# Patient Record
Sex: MALE | Race: WHITE | Employment: FULL TIME
[De-identification: names, ages, dates, MRNs, and addresses within clinical notes are randomized per-mention and may not be internally consistent; named-entity substitution may affect disease eponyms.]

---

## 2023-11-13 ENCOUNTER — APPOINTMENT (OUTPATIENT)
Facility: HOSPITAL | Age: 53
End: 2023-11-13
Payer: COMMERCIAL

## 2023-11-13 ENCOUNTER — HOSPITAL ENCOUNTER (EMERGENCY)
Facility: HOSPITAL | Age: 53
Discharge: ANOTHER HOSPITAL | End: 2023-11-14
Attending: EMERGENCY MEDICINE
Payer: COMMERCIAL

## 2023-11-13 DIAGNOSIS — R73.9 HYPERGLYCEMIA: ICD-10-CM

## 2023-11-13 DIAGNOSIS — E86.0 DEHYDRATION: ICD-10-CM

## 2023-11-13 DIAGNOSIS — K62.89 MASS OF ANUS: Primary | ICD-10-CM

## 2023-11-13 LAB
ALBUMIN SERPL-MCNC: 2.9 G/DL (ref 3.5–5)
ALBUMIN/GLOB SERPL: 0.7 (ref 1.1–2.2)
ALP SERPL-CCNC: 103 U/L (ref 45–117)
ALT SERPL-CCNC: 17 U/L (ref 12–78)
ANION GAP BLD CALC-SCNC: 8 (ref 10–20)
ANION GAP SERPL CALC-SCNC: 12 MMOL/L (ref 5–15)
APPEARANCE UR: CLEAR
AST SERPL-CCNC: 19 U/L (ref 15–37)
BACTERIA URNS QL MICRO: NEGATIVE /HPF
BASOPHILS # BLD: 0 K/UL (ref 0–0.1)
BASOPHILS NFR BLD: 0 % (ref 0–1)
BILIRUB SERPL-MCNC: 0.5 MG/DL (ref 0.2–1)
BILIRUB UR QL: NEGATIVE
BUN SERPL-MCNC: 8 MG/DL (ref 6–20)
BUN/CREAT SERPL: 8 (ref 12–20)
CA-I BLD-MCNC: 1.18 MMOL/L (ref 1.12–1.32)
CALCIUM SERPL-MCNC: 9.2 MG/DL (ref 8.5–10.1)
CHLORIDE BLD-SCNC: 99 MMOL/L (ref 100–108)
CHLORIDE SERPL-SCNC: 95 MMOL/L (ref 97–108)
CO2 BLD-SCNC: 26 MMOL/L (ref 19–24)
CO2 SERPL-SCNC: 26 MMOL/L (ref 21–32)
COLOR UR: ABNORMAL
CREAT SERPL-MCNC: 1.02 MG/DL (ref 0.7–1.3)
CREAT UR-MCNC: 0.7 MG/DL (ref 0.6–1.3)
DIFFERENTIAL METHOD BLD: ABNORMAL
EOSINOPHIL # BLD: 0 K/UL (ref 0–0.4)
EOSINOPHIL NFR BLD: 0 % (ref 0–7)
EPITH CASTS URNS QL MICRO: ABNORMAL /LPF
ERYTHROCYTE [DISTWIDTH] IN BLOOD BY AUTOMATED COUNT: 11 % (ref 11.5–14.5)
GLOBULIN SER CALC-MCNC: 4.3 G/DL (ref 2–4)
GLUCOSE BLD STRIP.AUTO-MCNC: 308 MG/DL (ref 65–117)
GLUCOSE BLD STRIP.AUTO-MCNC: 319 MG/DL (ref 65–117)
GLUCOSE BLD STRIP.AUTO-MCNC: 323 MG/DL (ref 65–117)
GLUCOSE BLD STRIP.AUTO-MCNC: 393 MG/DL (ref 65–117)
GLUCOSE BLD STRIP.AUTO-MCNC: 402 MG/DL (ref 74–106)
GLUCOSE SERPL-MCNC: 385 MG/DL (ref 65–100)
GLUCOSE UR STRIP.AUTO-MCNC: >1000 MG/DL
HCT VFR BLD AUTO: 43.2 % (ref 36.6–50.3)
HGB BLD-MCNC: 15 G/DL (ref 12.1–17)
HGB UR QL STRIP: NEGATIVE
IMM GRANULOCYTES # BLD AUTO: 0.1 K/UL (ref 0–0.04)
IMM GRANULOCYTES NFR BLD AUTO: 1 % (ref 0–0.5)
KETONES UR QL STRIP.AUTO: 80 MG/DL
LACTATE BLD-SCNC: 1.39 MMOL/L (ref 0.4–2)
LEUKOCYTE ESTERASE UR QL STRIP.AUTO: NEGATIVE
LIPASE SERPL-CCNC: 20 U/L (ref 13–75)
LYMPHOCYTES # BLD: 1.3 K/UL (ref 0.8–3.5)
LYMPHOCYTES NFR BLD: 10 % (ref 12–49)
MCH RBC QN AUTO: 31.3 PG (ref 26–34)
MCHC RBC AUTO-ENTMCNC: 34.7 G/DL (ref 30–36.5)
MCV RBC AUTO: 90.2 FL (ref 80–99)
MONOCYTES # BLD: 1.3 K/UL (ref 0–1)
MONOCYTES NFR BLD: 10 % (ref 5–13)
NEUTS SEG # BLD: 11 K/UL (ref 1.8–8)
NEUTS SEG NFR BLD: 79 % (ref 32–75)
NITRITE UR QL STRIP.AUTO: NEGATIVE
NRBC # BLD: 0 K/UL (ref 0–0.01)
NRBC BLD-RTO: 0 PER 100 WBC
PH UR STRIP: 5.5 (ref 5–8)
PLATELET # BLD AUTO: 327 K/UL (ref 150–400)
PMV BLD AUTO: 9.8 FL (ref 8.9–12.9)
POTASSIUM BLD-SCNC: 4.2 MMOL/L (ref 3.5–5.5)
POTASSIUM SERPL-SCNC: 4.4 MMOL/L (ref 3.5–5.1)
PROT SERPL-MCNC: 7.2 G/DL (ref 6.4–8.2)
PROT UR STRIP-MCNC: NEGATIVE MG/DL
RBC # BLD AUTO: 4.79 M/UL (ref 4.1–5.7)
RBC #/AREA URNS HPF: ABNORMAL /HPF (ref 0–5)
SERVICE CMNT-IMP: ABNORMAL
SODIUM BLD-SCNC: 133 MMOL/L (ref 136–145)
SODIUM SERPL-SCNC: 133 MMOL/L (ref 136–145)
SP GR UR REFRACTOMETRY: 1.01 (ref 1–1.03)
SPECIMEN SITE: ABNORMAL
TROPONIN I SERPL HS-MCNC: 32 NG/L (ref 0–76)
URINE CULTURE IF INDICATED: ABNORMAL
UROBILINOGEN UR QL STRIP.AUTO: 0.2 EU/DL (ref 0.2–1)
WBC # BLD AUTO: 13.9 K/UL (ref 4.1–11.1)
WBC URNS QL MICRO: ABNORMAL /HPF (ref 0–4)

## 2023-11-13 PROCEDURE — 74177 CT ABD & PELVIS W/CONTRAST: CPT

## 2023-11-13 PROCEDURE — 80053 COMPREHEN METABOLIC PANEL: CPT

## 2023-11-13 PROCEDURE — 83605 ASSAY OF LACTIC ACID: CPT

## 2023-11-13 PROCEDURE — 80047 BASIC METABLC PNL IONIZED CA: CPT

## 2023-11-13 PROCEDURE — 6360000002 HC RX W HCPCS: Performed by: EMERGENCY MEDICINE

## 2023-11-13 PROCEDURE — 36415 COLL VENOUS BLD VENIPUNCTURE: CPT

## 2023-11-13 PROCEDURE — 87040 BLOOD CULTURE FOR BACTERIA: CPT

## 2023-11-13 PROCEDURE — 96375 TX/PRO/DX INJ NEW DRUG ADDON: CPT

## 2023-11-13 PROCEDURE — 99285 EMERGENCY DEPT VISIT HI MDM: CPT

## 2023-11-13 PROCEDURE — 85025 COMPLETE CBC W/AUTO DIFF WBC: CPT

## 2023-11-13 PROCEDURE — 6370000000 HC RX 637 (ALT 250 FOR IP): Performed by: EMERGENCY MEDICINE

## 2023-11-13 PROCEDURE — 93005 ELECTROCARDIOGRAM TRACING: CPT | Performed by: EMERGENCY MEDICINE

## 2023-11-13 PROCEDURE — 82962 GLUCOSE BLOOD TEST: CPT

## 2023-11-13 PROCEDURE — 96376 TX/PRO/DX INJ SAME DRUG ADON: CPT

## 2023-11-13 PROCEDURE — 96366 THER/PROPH/DIAG IV INF ADDON: CPT

## 2023-11-13 PROCEDURE — 84484 ASSAY OF TROPONIN QUANT: CPT

## 2023-11-13 PROCEDURE — 87154 CUL TYP ID BLD PTHGN 6+ TRGT: CPT

## 2023-11-13 PROCEDURE — 96365 THER/PROPH/DIAG IV INF INIT: CPT

## 2023-11-13 PROCEDURE — 2580000003 HC RX 258: Performed by: EMERGENCY MEDICINE

## 2023-11-13 PROCEDURE — 81001 URINALYSIS AUTO W/SCOPE: CPT

## 2023-11-13 PROCEDURE — 96367 TX/PROPH/DG ADDL SEQ IV INF: CPT

## 2023-11-13 PROCEDURE — 96361 HYDRATE IV INFUSION ADD-ON: CPT

## 2023-11-13 PROCEDURE — 87077 CULTURE AEROBIC IDENTIFY: CPT

## 2023-11-13 PROCEDURE — 83690 ASSAY OF LIPASE: CPT

## 2023-11-13 PROCEDURE — 6360000004 HC RX CONTRAST MEDICATION: Performed by: EMERGENCY MEDICINE

## 2023-11-13 PROCEDURE — 87186 SC STD MICRODIL/AGAR DIL: CPT

## 2023-11-13 RX ORDER — MORPHINE SULFATE 2 MG/ML
2 INJECTION, SOLUTION INTRAMUSCULAR; INTRAVENOUS
Status: DISCONTINUED | OUTPATIENT
Start: 2023-11-13 | End: 2023-11-14 | Stop reason: HOSPADM

## 2023-11-13 RX ORDER — 0.9 % SODIUM CHLORIDE 0.9 %
1000 INTRAVENOUS SOLUTION INTRAVENOUS ONCE
Status: COMPLETED | OUTPATIENT
Start: 2023-11-13 | End: 2023-11-13

## 2023-11-13 RX ORDER — MORPHINE SULFATE 2 MG/ML
2 INJECTION, SOLUTION INTRAMUSCULAR; INTRAVENOUS
Status: COMPLETED | OUTPATIENT
Start: 2023-11-13 | End: 2023-11-13

## 2023-11-13 RX ORDER — SODIUM CHLORIDE 9 MG/ML
INJECTION, SOLUTION INTRAVENOUS CONTINUOUS
Status: DISCONTINUED | OUTPATIENT
Start: 2023-11-13 | End: 2023-11-14 | Stop reason: HOSPADM

## 2023-11-13 RX ORDER — MORPHINE SULFATE 2 MG/ML
2 INJECTION, SOLUTION INTRAMUSCULAR; INTRAVENOUS EVERY 4 HOURS PRN
Status: DISCONTINUED | OUTPATIENT
Start: 2023-11-13 | End: 2023-11-13

## 2023-11-13 RX ADMIN — MORPHINE SULFATE 2 MG: 2 INJECTION, SOLUTION INTRAMUSCULAR; INTRAVENOUS at 21:23

## 2023-11-13 RX ADMIN — VANCOMYCIN HYDROCHLORIDE 2250 MG: 1 INJECTION, POWDER, LYOPHILIZED, FOR SOLUTION INTRAVENOUS at 21:30

## 2023-11-13 RX ADMIN — SODIUM CHLORIDE 1000 ML: 9 INJECTION, SOLUTION INTRAVENOUS at 16:54

## 2023-11-13 RX ADMIN — PIPERACILLIN AND TAZOBACTAM 4500 MG: 4; .5 INJECTION, POWDER, LYOPHILIZED, FOR SOLUTION INTRAVENOUS at 20:20

## 2023-11-13 RX ADMIN — SODIUM CHLORIDE 1000 ML: 9 INJECTION, SOLUTION INTRAVENOUS at 18:17

## 2023-11-13 RX ADMIN — SODIUM CHLORIDE: 9 INJECTION, SOLUTION INTRAVENOUS at 23:20

## 2023-11-13 RX ADMIN — INSULIN HUMAN 10 UNITS: 100 INJECTION, SOLUTION PARENTERAL at 19:42

## 2023-11-13 RX ADMIN — MORPHINE SULFATE 2 MG: 2 INJECTION, SOLUTION INTRAMUSCULAR; INTRAVENOUS at 18:18

## 2023-11-13 RX ADMIN — IOPAMIDOL 100 ML: 755 INJECTION, SOLUTION INTRAVENOUS at 17:47

## 2023-11-13 ASSESSMENT — LIFESTYLE VARIABLES
HOW MANY STANDARD DRINKS CONTAINING ALCOHOL DO YOU HAVE ON A TYPICAL DAY: 1 OR 2
HOW OFTEN DO YOU HAVE A DRINK CONTAINING ALCOHOL: 4 OR MORE TIMES A WEEK

## 2023-11-13 ASSESSMENT — PAIN SCALES - GENERAL
PAINLEVEL_OUTOF10: 7
PAINLEVEL_OUTOF10: 10
PAINLEVEL_OUTOF10: 10
PAINLEVEL_OUTOF10: 5

## 2023-11-13 ASSESSMENT — PAIN DESCRIPTION - ORIENTATION
ORIENTATION: MID
ORIENTATION: MID

## 2023-11-13 ASSESSMENT — PAIN - FUNCTIONAL ASSESSMENT
PAIN_FUNCTIONAL_ASSESSMENT: 0-10
PAIN_FUNCTIONAL_ASSESSMENT: 0-10

## 2023-11-13 ASSESSMENT — PAIN DESCRIPTION - LOCATION
LOCATION: RECTUM

## 2023-11-13 ASSESSMENT — PAIN DESCRIPTION - DESCRIPTORS
DESCRIPTORS: ACHING;SHARP;SHOOTING
DESCRIPTORS: SHARP;SHOOTING
DESCRIPTORS: DISCOMFORT

## 2023-11-13 NOTE — ED TRIAGE NOTES
Reports rectal pain starting last Wednesday or Thursday, believes may be related hemorrhoids but not sure, has not noticed any bleeding when using restroom. States has also been having problems urinating and having BM. Has been feeling weak and fatigued. Has been having excessive sweating, especially at night waking up drenched.

## 2023-11-13 NOTE — ED TRIAGE NOTES
Lazarus Clara a lot with work. Within [address] and outside. Last international trip was to [address] [place] [address] about 3 weeks ago.

## 2023-11-14 ENCOUNTER — APPOINTMENT (OUTPATIENT)
Facility: HOSPITAL | Age: 53
DRG: 330 | End: 2023-11-14
Payer: COMMERCIAL

## 2023-11-14 ENCOUNTER — HOSPITAL ENCOUNTER (INPATIENT)
Facility: HOSPITAL | Age: 53
LOS: 6 days | Discharge: HOME | DRG: 330 | End: 2023-11-20
Attending: INTERNAL MEDICINE | Admitting: INTERNAL MEDICINE
Payer: COMMERCIAL

## 2023-11-14 ENCOUNTER — ANESTHESIA (OUTPATIENT)
Facility: HOSPITAL | Age: 53
End: 2023-11-14
Payer: COMMERCIAL

## 2023-11-14 ENCOUNTER — ANESTHESIA EVENT (OUTPATIENT)
Facility: HOSPITAL | Age: 53
End: 2023-11-14
Payer: COMMERCIAL

## 2023-11-14 VITALS
RESPIRATION RATE: 19 BRPM | BODY MASS INDEX: 32.02 KG/M2 | SYSTOLIC BLOOD PRESSURE: 165 MMHG | HEIGHT: 67 IN | OXYGEN SATURATION: 96 % | TEMPERATURE: 100.6 F | WEIGHT: 204 LBS | HEART RATE: 110 BPM | DIASTOLIC BLOOD PRESSURE: 84 MMHG

## 2023-11-14 DIAGNOSIS — I63.9 CEREBROVASCULAR ACCIDENT (CVA), UNSPECIFIED MECHANISM (HCC): Primary | ICD-10-CM

## 2023-11-14 DIAGNOSIS — K61.1 PERIRECTAL ABSCESS: ICD-10-CM

## 2023-11-14 DIAGNOSIS — R73.9 HYPERGLYCEMIA: ICD-10-CM

## 2023-11-14 DIAGNOSIS — K61.1 RECTAL ABSCESS: ICD-10-CM

## 2023-11-14 DIAGNOSIS — K62.89 RECTAL MASS: ICD-10-CM

## 2023-11-14 PROBLEM — E10.8 TYPE 1 DIABETES MELLITUS WITH COMPLICATION, WITH LONG TERM CURRENT USE OF INSULIN PUMP (HCC): Status: ACTIVE | Noted: 2023-11-14

## 2023-11-14 PROBLEM — Z96.41 TYPE 1 DIABETES MELLITUS WITH COMPLICATION, WITH LONG TERM CURRENT USE OF INSULIN PUMP (HCC): Status: ACTIVE | Noted: 2023-11-14

## 2023-11-14 PROBLEM — R73.09 HEMOGLOBIN A1C GREATER THAN 9%, INDICATING POOR DIABETIC CONTROL: Status: ACTIVE | Noted: 2023-11-14

## 2023-11-14 LAB
ACCESSION NUMBER, LLC1M: ABNORMAL
ACINETOBACTER CALCOAC BAUMANNII COMPLEX BY PCR: NOT DETECTED
ALBUMIN SERPL-MCNC: 2.4 G/DL (ref 3.5–5)
ALBUMIN SERPL-MCNC: 2.5 G/DL (ref 3.5–5)
ALBUMIN/GLOB SERPL: 0.6 (ref 1.1–2.2)
ALBUMIN/GLOB SERPL: 0.7 (ref 1.1–2.2)
ALP SERPL-CCNC: 65 U/L (ref 45–117)
ALP SERPL-CCNC: 78 U/L (ref 45–117)
ALT SERPL-CCNC: 15 U/L (ref 12–78)
ALT SERPL-CCNC: 17 U/L (ref 12–78)
ANION GAP BLD CALC-SCNC: 13 (ref 10–20)
ANION GAP SERPL CALC-SCNC: 10 MMOL/L (ref 5–15)
ANION GAP SERPL CALC-SCNC: 11 MMOL/L (ref 5–15)
AST SERPL-CCNC: 11 U/L (ref 15–37)
AST SERPL-CCNC: 28 U/L (ref 15–37)
B FRAGILIS DNA BLD POS QL NAA+NON-PROBE: NOT DETECTED
BASE DEFICIT BLD-SCNC: 5.2 MMOL/L
BASOPHILS # BLD: 0.1 K/UL (ref 0–0.1)
BASOPHILS NFR BLD: 0 % (ref 0–1)
BILIRUB SERPL-MCNC: 0.5 MG/DL (ref 0.2–1)
BILIRUB SERPL-MCNC: 0.6 MG/DL (ref 0.2–1)
BIOFIRE TEST COMMENT: ABNORMAL
BLACTX-M ISLT/SPM QL: NOT DETECTED
BLAIMP ISLT/SPM QL: NOT DETECTED
BLAKPC BLD POS QL NAA+NON-PROBE: NOT DETECTED
BLAOXA-48-LIKE ISLT/SPM QL: NOT DETECTED
BLAVIM ISLT/SPM QL: NOT DETECTED
BUN SERPL-MCNC: 6 MG/DL (ref 6–20)
BUN SERPL-MCNC: 6 MG/DL (ref 6–20)
BUN/CREAT SERPL: 8 (ref 12–20)
BUN/CREAT SERPL: 8 (ref 12–20)
C ALBICANS DNA BLD POS QL NAA+NON-PROBE: NOT DETECTED
C AURIS DNA BLD POS QL NAA+NON-PROBE: NOT DETECTED
C GATTII+NEOFOR DNA BLD POS QL NAA+N-PRB: NOT DETECTED
C GLABRATA DNA BLD POS QL NAA+NON-PROBE: NOT DETECTED
C KRUSEI DNA BLD POS QL NAA+NON-PROBE: NOT DETECTED
C PARAP DNA BLD POS QL NAA+NON-PROBE: NOT DETECTED
C TROPICLS DNA BLD POS QL NAA+NON-PROBE: NOT DETECTED
CA-I BLD-MCNC: 1.12 MMOL/L (ref 1.12–1.32)
CALCIUM SERPL-MCNC: 7.7 MG/DL (ref 8.5–10.1)
CALCIUM SERPL-MCNC: 8.3 MG/DL (ref 8.5–10.1)
CHLORIDE BLD-SCNC: 101 MMOL/L (ref 100–108)
CHLORIDE SERPL-SCNC: 102 MMOL/L (ref 97–108)
CHLORIDE SERPL-SCNC: 104 MMOL/L (ref 97–108)
CO2 BLD-SCNC: 20 MMOL/L (ref 19–24)
CO2 SERPL-SCNC: 20 MMOL/L (ref 21–32)
CO2 SERPL-SCNC: 22 MMOL/L (ref 21–32)
COLISTIN RES MCR-1 ISLT/SPM QL: NOT DETECTED
COMMENT:: NORMAL
CREAT SERPL-MCNC: 0.78 MG/DL (ref 0.7–1.3)
CREAT SERPL-MCNC: 0.79 MG/DL (ref 0.7–1.3)
CREAT UR-MCNC: 0.6 MG/DL (ref 0.6–1.3)
DIFFERENTIAL METHOD BLD: ABNORMAL
E CLOAC COMP DNA BLD POS NAA+NON-PROBE: NOT DETECTED
E COLI DNA BLD POS QL NAA+NON-PROBE: NOT DETECTED
E FAECALIS DNA BLD POS QL NAA+NON-PROBE: NOT DETECTED
E FAECIUM DNA BLD POS QL NAA+NON-PROBE: NOT DETECTED
EKG ATRIAL RATE: 108 BPM
EKG DIAGNOSIS: NORMAL
EKG P AXIS: 32 DEGREES
EKG P-R INTERVAL: 112 MS
EKG Q-T INTERVAL: 330 MS
EKG QRS DURATION: 88 MS
EKG QTC CALCULATION (BAZETT): 442 MS
EKG R AXIS: 40 DEGREES
EKG T AXIS: 30 DEGREES
EKG VENTRICULAR RATE: 108 BPM
ENTEROBACTERALES DNA BLD POS NAA+N-PRB: DETECTED
EOSINOPHIL # BLD: 0 K/UL (ref 0–0.4)
EOSINOPHIL NFR BLD: 0 % (ref 0–7)
ERYTHROCYTE [DISTWIDTH] IN BLOOD BY AUTOMATED COUNT: 11.4 % (ref 11.5–14.5)
EST. AVERAGE GLUCOSE BLD GHB EST-MCNC: 240 MG/DL
FLUAV AG NPH QL IA: NEGATIVE
FLUBV AG NOSE QL IA: NEGATIVE
GLOBULIN SER CALC-MCNC: 3.6 G/DL (ref 2–4)
GLOBULIN SER CALC-MCNC: 4 G/DL (ref 2–4)
GLUCOSE BLD STRIP.AUTO-MCNC: 165 MG/DL (ref 65–117)
GLUCOSE BLD STRIP.AUTO-MCNC: 180 MG/DL (ref 65–117)
GLUCOSE BLD STRIP.AUTO-MCNC: 185 MG/DL (ref 65–117)
GLUCOSE BLD STRIP.AUTO-MCNC: 293 MG/DL (ref 65–117)
GLUCOSE BLD STRIP.AUTO-MCNC: 307 MG/DL (ref 74–106)
GLUCOSE BLD STRIP.AUTO-MCNC: 381 MG/DL (ref 65–117)
GLUCOSE SERPL-MCNC: 294 MG/DL (ref 65–100)
GLUCOSE SERPL-MCNC: 310 MG/DL (ref 65–100)
GP B STREP DNA BLD POS QL NAA+NON-PROBE: NOT DETECTED
HAEM INFLU DNA BLD POS QL NAA+NON-PROBE: NOT DETECTED
HBA1C MFR BLD: 10 % (ref 4–5.6)
HCO3 BLDA-SCNC: 20 MMOL/L
HCT VFR BLD AUTO: 36.8 % (ref 36.6–50.3)
HGB BLD-MCNC: 12.8 G/DL (ref 12.1–17)
IMM GRANULOCYTES # BLD AUTO: 0.1 K/UL (ref 0–0.04)
IMM GRANULOCYTES NFR BLD AUTO: 1 % (ref 0–0.5)
K OXYTOCA DNA BLD POS QL NAA+NON-PROBE: NOT DETECTED
KLEBSIELLA SP DNA BLD POS QL NAA+NON-PRB: DETECTED
KLEBSIELLA SP DNA BLD POS QL NAA+NON-PRB: NOT DETECTED
L MONOCYTOG DNA BLD POS QL NAA+NON-PROBE: NOT DETECTED
LACTATE BLD-SCNC: 0.71 MMOL/L (ref 0.4–2)
LIPASE SERPL-CCNC: 13 U/L (ref 13–75)
LYMPHOCYTES # BLD: 1.7 K/UL (ref 0.8–3.5)
LYMPHOCYTES NFR BLD: 12 % (ref 12–49)
MAGNESIUM SERPL-MCNC: 1.3 MG/DL (ref 1.6–2.4)
MCH RBC QN AUTO: 31.8 PG (ref 26–34)
MCHC RBC AUTO-ENTMCNC: 34.8 G/DL (ref 30–36.5)
MCV RBC AUTO: 91.5 FL (ref 80–99)
MONOCYTES # BLD: 1.9 K/UL (ref 0–1)
MONOCYTES NFR BLD: 13 % (ref 5–13)
N MEN DNA BLD POS QL NAA+NON-PROBE: NOT DETECTED
NEUTS SEG # BLD: 11.3 K/UL (ref 1.8–8)
NEUTS SEG NFR BLD: 74 % (ref 32–75)
NRBC # BLD: 0 K/UL (ref 0–0.01)
NRBC BLD-RTO: 0 PER 100 WBC
P AERUGINOSA DNA BLD POS NAA+NON-PROBE: NOT DETECTED
PCO2 BLDV: 36.3 MMHG (ref 41–51)
PH BLDV: 7.35 (ref 7.32–7.42)
PLATELET # BLD AUTO: 322 K/UL (ref 150–400)
PMV BLD AUTO: 10 FL (ref 8.9–12.9)
PO2 BLDV: 44 MMHG (ref 25–40)
POTASSIUM BLD-SCNC: 3.8 MMOL/L (ref 3.5–5.5)
POTASSIUM SERPL-SCNC: 3.7 MMOL/L (ref 3.5–5.1)
POTASSIUM SERPL-SCNC: 4.2 MMOL/L (ref 3.5–5.1)
PROT SERPL-MCNC: 6 G/DL (ref 6.4–8.2)
PROT SERPL-MCNC: 6.5 G/DL (ref 6.4–8.2)
PROTEUS SP DNA BLD POS QL NAA+NON-PROBE: NOT DETECTED
RBC # BLD AUTO: 4.02 M/UL (ref 4.1–5.7)
RESISTANT GENE NDM BY PCR: NOT DETECTED
RESISTANT GENE TARGETS: ABNORMAL
RSV RNA NPH QL NAA+PROBE: NOT DETECTED
S AUREUS DNA BLD POS QL NAA+NON-PROBE: NOT DETECTED
S AUREUS+CONS DNA BLD POS NAA+NON-PROBE: NOT DETECTED
S EPIDERMIDIS DNA BLD POS QL NAA+NON-PRB: NOT DETECTED
S LUGDUNENSIS DNA BLD POS QL NAA+NON-PRB: NOT DETECTED
S MALTOPHILIA DNA BLD POS QL NAA+NON-PRB: NOT DETECTED
S MARCESCENS DNA BLD POS NAA+NON-PROBE: NOT DETECTED
S PNEUM DNA BLD POS QL NAA+NON-PROBE: NOT DETECTED
S PYO DNA BLD POS QL NAA+NON-PROBE: NOT DETECTED
SALMONELLA DNA BLD POS QL NAA+NON-PROBE: NOT DETECTED
SAO2 % BLD: 78 %
SARS-COV-2 RDRP RESP QL NAA+PROBE: NOT DETECTED
SERVICE CMNT-IMP: ABNORMAL
SODIUM BLD-SCNC: 134 MMOL/L (ref 136–145)
SODIUM SERPL-SCNC: 133 MMOL/L (ref 136–145)
SODIUM SERPL-SCNC: 136 MMOL/L (ref 136–145)
SOURCE: NORMAL
SPECIMEN HOLD: NORMAL
SPECIMEN SITE: ABNORMAL
STREPTOCOCCUS DNA BLD POS NAA+NON-PROBE: NOT DETECTED
TROPONIN I SERPL HS-MCNC: 27 NG/L (ref 0–76)
WBC # BLD AUTO: 15.1 K/UL (ref 4.1–11.1)

## 2023-11-14 PROCEDURE — 96366 THER/PROPH/DIAG IV INF ADDON: CPT

## 2023-11-14 PROCEDURE — 71045 X-RAY EXAM CHEST 1 VIEW: CPT

## 2023-11-14 PROCEDURE — 7100000000 HC PACU RECOVERY - FIRST 15 MIN: Performed by: SURGERY

## 2023-11-14 PROCEDURE — 6370000000 HC RX 637 (ALT 250 FOR IP): Performed by: EMERGENCY MEDICINE

## 2023-11-14 PROCEDURE — 2709999900 HC NON-CHARGEABLE SUPPLY: Performed by: SURGERY

## 2023-11-14 PROCEDURE — 7100000001 HC PACU RECOVERY - ADDTL 15 MIN: Performed by: SURGERY

## 2023-11-14 PROCEDURE — 6370000000 HC RX 637 (ALT 250 FOR IP)

## 2023-11-14 PROCEDURE — 3600000002 HC SURGERY LEVEL 2 BASE: Performed by: SURGERY

## 2023-11-14 PROCEDURE — 0D9P0ZZ DRAINAGE OF RECTUM, OPEN APPROACH: ICD-10-PCS | Performed by: SURGERY

## 2023-11-14 PROCEDURE — 2580000003 HC RX 258

## 2023-11-14 PROCEDURE — 88305 TISSUE EXAM BY PATHOLOGIST: CPT

## 2023-11-14 PROCEDURE — 87077 CULTURE AEROBIC IDENTIFY: CPT

## 2023-11-14 PROCEDURE — 83735 ASSAY OF MAGNESIUM: CPT

## 2023-11-14 PROCEDURE — 82947 ASSAY GLUCOSE BLOOD QUANT: CPT

## 2023-11-14 PROCEDURE — 1100000003 HC PRIVATE W/ TELEMETRY

## 2023-11-14 PROCEDURE — 0D9P00Z DRAINAGE OF RECTUM WITH DRAINAGE DEVICE, OPEN APPROACH: ICD-10-PCS | Performed by: SURGERY

## 2023-11-14 PROCEDURE — 83036 HEMOGLOBIN GLYCOSYLATED A1C: CPT

## 2023-11-14 PROCEDURE — 4500000002 HC ER NO CHARGE

## 2023-11-14 PROCEDURE — 82803 BLOOD GASES ANY COMBINATION: CPT

## 2023-11-14 PROCEDURE — 6360000002 HC RX W HCPCS

## 2023-11-14 PROCEDURE — 3700000000 HC ANESTHESIA ATTENDED CARE: Performed by: SURGERY

## 2023-11-14 PROCEDURE — 87205 SMEAR GRAM STAIN: CPT

## 2023-11-14 PROCEDURE — 87635 SARS-COV-2 COVID-19 AMP PRB: CPT

## 2023-11-14 PROCEDURE — 82962 GLUCOSE BLOOD TEST: CPT

## 2023-11-14 PROCEDURE — 87634 RSV DNA/RNA AMP PROBE: CPT

## 2023-11-14 PROCEDURE — 3700000001 HC ADD 15 MINUTES (ANESTHESIA): Performed by: SURGERY

## 2023-11-14 PROCEDURE — 6360000002 HC RX W HCPCS: Performed by: EMERGENCY MEDICINE

## 2023-11-14 PROCEDURE — 80053 COMPREHEN METABOLIC PANEL: CPT

## 2023-11-14 PROCEDURE — 2500000003 HC RX 250 WO HCPCS: Performed by: REGISTERED NURSE

## 2023-11-14 PROCEDURE — 36415 COLL VENOUS BLD VENIPUNCTURE: CPT

## 2023-11-14 PROCEDURE — 84295 ASSAY OF SERUM SODIUM: CPT

## 2023-11-14 PROCEDURE — 6360000002 HC RX W HCPCS: Performed by: REGISTERED NURSE

## 2023-11-14 PROCEDURE — 96361 HYDRATE IV INFUSION ADD-ON: CPT

## 2023-11-14 PROCEDURE — 96375 TX/PRO/DX INJ NEW DRUG ADDON: CPT

## 2023-11-14 PROCEDURE — 87070 CULTURE OTHR SPECIMN AEROBIC: CPT

## 2023-11-14 PROCEDURE — 84484 ASSAY OF TROPONIN QUANT: CPT

## 2023-11-14 PROCEDURE — 0JB90ZX EXCISION OF BUTTOCK SUBCUTANEOUS TISSUE AND FASCIA, OPEN APPROACH, DIAGNOSTIC: ICD-10-PCS | Performed by: SURGERY

## 2023-11-14 PROCEDURE — 96376 TX/PRO/DX INJ SAME DRUG ADON: CPT

## 2023-11-14 PROCEDURE — 84132 ASSAY OF SERUM POTASSIUM: CPT

## 2023-11-14 PROCEDURE — 82330 ASSAY OF CALCIUM: CPT

## 2023-11-14 PROCEDURE — 6360000002 HC RX W HCPCS: Performed by: SURGERY

## 2023-11-14 PROCEDURE — 87804 INFLUENZA ASSAY W/OPTIC: CPT

## 2023-11-14 PROCEDURE — 93005 ELECTROCARDIOGRAM TRACING: CPT

## 2023-11-14 PROCEDURE — 87186 SC STD MICRODIL/AGAR DIL: CPT

## 2023-11-14 PROCEDURE — 83690 ASSAY OF LIPASE: CPT

## 2023-11-14 PROCEDURE — 85025 COMPLETE CBC W/AUTO DIFF WBC: CPT

## 2023-11-14 PROCEDURE — 99221 1ST HOSP IP/OBS SF/LOW 40: CPT

## 2023-11-14 PROCEDURE — 3600000012 HC SURGERY LEVEL 2 ADDTL 15MIN: Performed by: SURGERY

## 2023-11-14 RX ORDER — ONDANSETRON 2 MG/ML
4 INJECTION INTRAMUSCULAR; INTRAVENOUS
Status: COMPLETED | OUTPATIENT
Start: 2023-11-14 | End: 2023-11-14

## 2023-11-14 RX ORDER — MORPHINE SULFATE 4 MG/ML
4 INJECTION, SOLUTION INTRAMUSCULAR; INTRAVENOUS
Status: COMPLETED | OUTPATIENT
Start: 2023-11-14 | End: 2023-11-14

## 2023-11-14 RX ORDER — OXYMETAZOLINE HYDROCHLORIDE 0.05 G/100ML
2 SPRAY NASAL ONCE
Status: COMPLETED | OUTPATIENT
Start: 2023-11-14 | End: 2023-11-14

## 2023-11-14 RX ORDER — INSULIN LISPRO 100 [IU]/ML
0-8 INJECTION, SOLUTION INTRAVENOUS; SUBCUTANEOUS
Status: DISCONTINUED | OUTPATIENT
Start: 2023-11-14 | End: 2023-11-20

## 2023-11-14 RX ORDER — INSULIN GLARGINE 100 [IU]/ML
34 INJECTION, SOLUTION SUBCUTANEOUS NIGHTLY
Status: DISCONTINUED | OUTPATIENT
Start: 2023-11-14 | End: 2023-11-16

## 2023-11-14 RX ORDER — INSULIN LISPRO 100 [IU]/ML
0-4 INJECTION, SOLUTION INTRAVENOUS; SUBCUTANEOUS EVERY 4 HOURS
Status: DISCONTINUED | OUTPATIENT
Start: 2023-11-14 | End: 2023-11-14

## 2023-11-14 RX ORDER — SODIUM CHLORIDE 0.9 % (FLUSH) 0.9 %
5-40 SYRINGE (ML) INJECTION PRN
Status: DISCONTINUED | OUTPATIENT
Start: 2023-11-14 | End: 2023-11-14 | Stop reason: HOSPADM

## 2023-11-14 RX ORDER — ACETAMINOPHEN 650 MG/1
650 SUPPOSITORY RECTAL EVERY 6 HOURS PRN
Status: DISCONTINUED | OUTPATIENT
Start: 2023-11-14 | End: 2023-11-20 | Stop reason: HOSPADM

## 2023-11-14 RX ORDER — POTASSIUM CHLORIDE 7.45 MG/ML
10 INJECTION INTRAVENOUS PRN
Status: DISCONTINUED | OUTPATIENT
Start: 2023-11-14 | End: 2023-11-20 | Stop reason: HOSPADM

## 2023-11-14 RX ORDER — 0.9 % SODIUM CHLORIDE 0.9 %
1000 INTRAVENOUS SOLUTION INTRAVENOUS ONCE
Status: COMPLETED | OUTPATIENT
Start: 2023-11-14 | End: 2023-11-14

## 2023-11-14 RX ORDER — SODIUM CHLORIDE 9 MG/ML
INJECTION, SOLUTION INTRAVENOUS CONTINUOUS
Status: ACTIVE | OUTPATIENT
Start: 2023-11-14 | End: 2023-11-15

## 2023-11-14 RX ORDER — SODIUM CHLORIDE 9 MG/ML
INJECTION, SOLUTION INTRAVENOUS PRN
Status: DISCONTINUED | OUTPATIENT
Start: 2023-11-14 | End: 2023-11-20 | Stop reason: HOSPADM

## 2023-11-14 RX ORDER — SENNOSIDES A AND B 8.6 MG/1
1 TABLET, FILM COATED ORAL NIGHTLY
Status: DISCONTINUED | OUTPATIENT
Start: 2023-11-14 | End: 2023-11-20 | Stop reason: HOSPADM

## 2023-11-14 RX ORDER — DEXTROSE MONOHYDRATE 100 MG/ML
INJECTION, SOLUTION INTRAVENOUS CONTINUOUS PRN
Status: DISCONTINUED | OUTPATIENT
Start: 2023-11-14 | End: 2023-11-20 | Stop reason: HOSPADM

## 2023-11-14 RX ORDER — POLYETHYLENE GLYCOL 3350 17 G/17G
17 POWDER, FOR SOLUTION ORAL DAILY
Status: DISCONTINUED | OUTPATIENT
Start: 2023-11-14 | End: 2023-11-20 | Stop reason: HOSPADM

## 2023-11-14 RX ORDER — HYDROMORPHONE HYDROCHLORIDE 1 MG/ML
0.5 INJECTION, SOLUTION INTRAMUSCULAR; INTRAVENOUS; SUBCUTANEOUS EVERY 5 MIN PRN
Status: DISCONTINUED | OUTPATIENT
Start: 2023-11-14 | End: 2023-11-14 | Stop reason: HOSPADM

## 2023-11-14 RX ORDER — SUCCINYLCHOLINE CHLORIDE 20 MG/ML
INJECTION INTRAMUSCULAR; INTRAVENOUS PRN
Status: DISCONTINUED | OUTPATIENT
Start: 2023-11-14 | End: 2023-11-14 | Stop reason: SDUPTHER

## 2023-11-14 RX ORDER — BISACODYL 10 MG
10 SUPPOSITORY, RECTAL RECTAL DAILY PRN
Status: DISCONTINUED | OUTPATIENT
Start: 2023-11-14 | End: 2023-11-14

## 2023-11-14 RX ORDER — SODIUM CHLORIDE 9 MG/ML
INJECTION, SOLUTION INTRAVENOUS PRN
Status: DISCONTINUED | OUTPATIENT
Start: 2023-11-14 | End: 2023-11-14 | Stop reason: HOSPADM

## 2023-11-14 RX ORDER — ACETAMINOPHEN 500 MG
1000 TABLET ORAL
Status: COMPLETED | OUTPATIENT
Start: 2023-11-14 | End: 2023-11-14

## 2023-11-14 RX ORDER — OXYCODONE HYDROCHLORIDE AND ACETAMINOPHEN 5; 325 MG/1; MG/1
1 TABLET ORAL EVERY 6 HOURS PRN
Status: DISCONTINUED | OUTPATIENT
Start: 2023-11-14 | End: 2023-11-20 | Stop reason: HOSPADM

## 2023-11-14 RX ORDER — ROCURONIUM BROMIDE 10 MG/ML
INJECTION, SOLUTION INTRAVENOUS PRN
Status: DISCONTINUED | OUTPATIENT
Start: 2023-11-14 | End: 2023-11-14 | Stop reason: SDUPTHER

## 2023-11-14 RX ORDER — HYDROMORPHONE HYDROCHLORIDE 2 MG/ML
INJECTION, SOLUTION INTRAMUSCULAR; INTRAVENOUS; SUBCUTANEOUS PRN
Status: DISCONTINUED | OUTPATIENT
Start: 2023-11-14 | End: 2023-11-14 | Stop reason: SDUPTHER

## 2023-11-14 RX ORDER — DEXAMETHASONE SODIUM PHOSPHATE 4 MG/ML
INJECTION, SOLUTION INTRA-ARTICULAR; INTRALESIONAL; INTRAMUSCULAR; INTRAVENOUS; SOFT TISSUE PRN
Status: DISCONTINUED | OUTPATIENT
Start: 2023-11-14 | End: 2023-11-14 | Stop reason: SDUPTHER

## 2023-11-14 RX ORDER — SODIUM CHLORIDE 0.9 % (FLUSH) 0.9 %
5-40 SYRINGE (ML) INJECTION PRN
Status: DISCONTINUED | OUTPATIENT
Start: 2023-11-14 | End: 2023-11-20 | Stop reason: HOSPADM

## 2023-11-14 RX ORDER — ESMOLOL HYDROCHLORIDE 10 MG/ML
INJECTION, SOLUTION INTRAVENOUS PRN
Status: DISCONTINUED | OUTPATIENT
Start: 2023-11-14 | End: 2023-11-14 | Stop reason: SDUPTHER

## 2023-11-14 RX ORDER — IBUPROFEN 600 MG/1
600 TABLET ORAL
Status: COMPLETED | OUTPATIENT
Start: 2023-11-14 | End: 2023-11-14

## 2023-11-14 RX ORDER — POTASSIUM CHLORIDE 20 MEQ/1
40 TABLET, EXTENDED RELEASE ORAL PRN
Status: DISCONTINUED | OUTPATIENT
Start: 2023-11-14 | End: 2023-11-20 | Stop reason: HOSPADM

## 2023-11-14 RX ORDER — ACETAMINOPHEN 325 MG/1
650 TABLET ORAL EVERY 6 HOURS PRN
Status: DISCONTINUED | OUTPATIENT
Start: 2023-11-14 | End: 2023-11-20 | Stop reason: HOSPADM

## 2023-11-14 RX ORDER — SODIUM CHLORIDE 0.9 % (FLUSH) 0.9 %
5-40 SYRINGE (ML) INJECTION EVERY 12 HOURS SCHEDULED
Status: DISCONTINUED | OUTPATIENT
Start: 2023-11-14 | End: 2023-11-14 | Stop reason: HOSPADM

## 2023-11-14 RX ORDER — MIDAZOLAM HYDROCHLORIDE 1 MG/ML
INJECTION INTRAMUSCULAR; INTRAVENOUS PRN
Status: DISCONTINUED | OUTPATIENT
Start: 2023-11-14 | End: 2023-11-14 | Stop reason: SDUPTHER

## 2023-11-14 RX ORDER — LIDOCAINE HYDROCHLORIDE 20 MG/ML
INJECTION, SOLUTION EPIDURAL; INFILTRATION; INTRACAUDAL; PERINEURAL PRN
Status: DISCONTINUED | OUTPATIENT
Start: 2023-11-14 | End: 2023-11-14 | Stop reason: SDUPTHER

## 2023-11-14 RX ORDER — METOCLOPRAMIDE HYDROCHLORIDE 5 MG/ML
10 INJECTION INTRAMUSCULAR; INTRAVENOUS EVERY 6 HOURS
Status: DISCONTINUED | OUTPATIENT
Start: 2023-11-14 | End: 2023-11-14 | Stop reason: HOSPADM

## 2023-11-14 RX ORDER — INSULIN LISPRO 100 [IU]/ML
0-8 INJECTION, SOLUTION INTRAVENOUS; SUBCUTANEOUS
Status: DISCONTINUED | OUTPATIENT
Start: 2023-11-14 | End: 2023-11-14

## 2023-11-14 RX ORDER — INSULIN LISPRO 100 [IU]/ML
0-4 INJECTION, SOLUTION INTRAVENOUS; SUBCUTANEOUS NIGHTLY
Status: DISCONTINUED | OUTPATIENT
Start: 2023-11-14 | End: 2023-11-14

## 2023-11-14 RX ORDER — MORPHINE SULFATE 2 MG/ML
2 INJECTION, SOLUTION INTRAMUSCULAR; INTRAVENOUS EVERY 4 HOURS PRN
Status: DISCONTINUED | OUTPATIENT
Start: 2023-11-14 | End: 2023-11-20 | Stop reason: HOSPADM

## 2023-11-14 RX ORDER — HYDRALAZINE HYDROCHLORIDE 20 MG/ML
10 INJECTION INTRAMUSCULAR; INTRAVENOUS EVERY 6 HOURS PRN
Status: DISCONTINUED | OUTPATIENT
Start: 2023-11-14 | End: 2023-11-20 | Stop reason: HOSPADM

## 2023-11-14 RX ORDER — PROCHLORPERAZINE EDISYLATE 5 MG/ML
5 INJECTION INTRAMUSCULAR; INTRAVENOUS
Status: DISCONTINUED | OUTPATIENT
Start: 2023-11-14 | End: 2023-11-14 | Stop reason: HOSPADM

## 2023-11-14 RX ORDER — FENTANYL CITRATE 50 UG/ML
INJECTION, SOLUTION INTRAMUSCULAR; INTRAVENOUS PRN
Status: DISCONTINUED | OUTPATIENT
Start: 2023-11-14 | End: 2023-11-14 | Stop reason: SDUPTHER

## 2023-11-14 RX ORDER — OXYCODONE HYDROCHLORIDE 5 MG/1
5 TABLET ORAL
Status: DISCONTINUED | OUTPATIENT
Start: 2023-11-14 | End: 2023-11-14 | Stop reason: HOSPADM

## 2023-11-14 RX ORDER — FENTANYL CITRATE 50 UG/ML
25 INJECTION, SOLUTION INTRAMUSCULAR; INTRAVENOUS EVERY 5 MIN PRN
Status: DISCONTINUED | OUTPATIENT
Start: 2023-11-14 | End: 2023-11-14 | Stop reason: HOSPADM

## 2023-11-14 RX ORDER — ONDANSETRON 2 MG/ML
4 INJECTION INTRAMUSCULAR; INTRAVENOUS EVERY 6 HOURS PRN
Status: DISCONTINUED | OUTPATIENT
Start: 2023-11-14 | End: 2023-11-20 | Stop reason: HOSPADM

## 2023-11-14 RX ORDER — HYDROMORPHONE HYDROCHLORIDE 1 MG/ML
1 INJECTION, SOLUTION INTRAMUSCULAR; INTRAVENOUS; SUBCUTANEOUS EVERY 4 HOURS PRN
Status: DISCONTINUED | OUTPATIENT
Start: 2023-11-14 | End: 2023-11-14 | Stop reason: HOSPADM

## 2023-11-14 RX ORDER — ONDANSETRON 2 MG/ML
4 INJECTION INTRAMUSCULAR; INTRAVENOUS
Status: DISCONTINUED | OUTPATIENT
Start: 2023-11-14 | End: 2023-11-14 | Stop reason: HOSPADM

## 2023-11-14 RX ORDER — MAGNESIUM SULFATE IN WATER 40 MG/ML
2000 INJECTION, SOLUTION INTRAVENOUS PRN
Status: DISCONTINUED | OUTPATIENT
Start: 2023-11-14 | End: 2023-11-20 | Stop reason: HOSPADM

## 2023-11-14 RX ORDER — SODIUM CHLORIDE 0.9 % (FLUSH) 0.9 %
5-40 SYRINGE (ML) INJECTION EVERY 12 HOURS SCHEDULED
Status: DISCONTINUED | OUTPATIENT
Start: 2023-11-14 | End: 2023-11-20 | Stop reason: HOSPADM

## 2023-11-14 RX ORDER — ONDANSETRON 4 MG/1
4 TABLET, ORALLY DISINTEGRATING ORAL EVERY 8 HOURS PRN
Status: DISCONTINUED | OUTPATIENT
Start: 2023-11-14 | End: 2023-11-20 | Stop reason: HOSPADM

## 2023-11-14 RX ORDER — LANOLIN ALCOHOL/MO/W.PET/CERES
3 CREAM (GRAM) TOPICAL NIGHTLY PRN
Status: DISCONTINUED | OUTPATIENT
Start: 2023-11-14 | End: 2023-11-20 | Stop reason: HOSPADM

## 2023-11-14 RX ORDER — INSULIN LISPRO 100 [IU]/ML
0-4 INJECTION, SOLUTION INTRAVENOUS; SUBCUTANEOUS NIGHTLY
Status: DISCONTINUED | OUTPATIENT
Start: 2023-11-14 | End: 2023-11-20

## 2023-11-14 RX ORDER — BUPIVACAINE HYDROCHLORIDE 5 MG/ML
INJECTION, SOLUTION EPIDURAL; INTRACAUDAL PRN
Status: DISCONTINUED | OUTPATIENT
Start: 2023-11-14 | End: 2023-11-14 | Stop reason: ALTCHOICE

## 2023-11-14 RX ORDER — POLYETHYLENE GLYCOL 3350 17 G/17G
17 POWDER, FOR SOLUTION ORAL DAILY PRN
Status: DISCONTINUED | OUTPATIENT
Start: 2023-11-14 | End: 2023-11-20 | Stop reason: HOSPADM

## 2023-11-14 RX ORDER — ONDANSETRON 2 MG/ML
INJECTION INTRAMUSCULAR; INTRAVENOUS PRN
Status: DISCONTINUED | OUTPATIENT
Start: 2023-11-14 | End: 2023-11-14 | Stop reason: SDUPTHER

## 2023-11-14 RX ADMIN — OXYCODONE HYDROCHLORIDE AND ACETAMINOPHEN 1 TABLET: 5; 325 TABLET ORAL at 20:32

## 2023-11-14 RX ADMIN — MELATONIN 3 MG: at 20:33

## 2023-11-14 RX ADMIN — INSULIN LISPRO 4 UNITS: 100 INJECTION, SOLUTION INTRAVENOUS; SUBCUTANEOUS at 20:23

## 2023-11-14 RX ADMIN — MORPHINE SULFATE 4 MG: 4 INJECTION, SOLUTION INTRAMUSCULAR; INTRAVENOUS at 07:32

## 2023-11-14 RX ADMIN — INSULIN HUMAN 10 UNITS: 100 INJECTION, SOLUTION PARENTERAL at 08:40

## 2023-11-14 RX ADMIN — SODIUM CHLORIDE, PRESERVATIVE FREE 10 ML: 5 INJECTION INTRAVENOUS at 20:24

## 2023-11-14 RX ADMIN — FENTANYL CITRATE 50 MCG: 50 INJECTION, SOLUTION INTRAMUSCULAR; INTRAVENOUS at 15:17

## 2023-11-14 RX ADMIN — MIDAZOLAM HYDROCHLORIDE 2 MG: 1 INJECTION, SOLUTION INTRAMUSCULAR; INTRAVENOUS at 15:13

## 2023-11-14 RX ADMIN — DEXAMETHASONE SODIUM PHOSPHATE 4 MG: 4 INJECTION, SOLUTION INTRAMUSCULAR; INTRAVENOUS at 15:24

## 2023-11-14 RX ADMIN — SODIUM CHLORIDE 1000 ML: 9 INJECTION, SOLUTION INTRAVENOUS at 07:44

## 2023-11-14 RX ADMIN — SODIUM CHLORIDE, PRESERVATIVE FREE 10 ML: 5 INJECTION INTRAVENOUS at 11:00

## 2023-11-14 RX ADMIN — ESMOLOL HYDROCHLORIDE 10 MG: 10 INJECTION INTRAVENOUS at 16:09

## 2023-11-14 RX ADMIN — VANCOMYCIN HYDROCHLORIDE 2250 MG: 1 INJECTION, POWDER, LYOPHILIZED, FOR SOLUTION INTRAVENOUS at 08:00

## 2023-11-14 RX ADMIN — ESMOLOL HYDROCHLORIDE 10 MG: 10 INJECTION INTRAVENOUS at 16:01

## 2023-11-14 RX ADMIN — LIDOCAINE HYDROCHLORIDE 100 MG: 20 INJECTION, SOLUTION EPIDURAL; INFILTRATION; INTRACAUDAL; PERINEURAL at 15:17

## 2023-11-14 RX ADMIN — ESMOLOL HYDROCHLORIDE 10 MG: 10 INJECTION INTRAVENOUS at 16:04

## 2023-11-14 RX ADMIN — PIPERACILLIN AND TAZOBACTAM 3375 MG: 3; .375 INJECTION, POWDER, LYOPHILIZED, FOR SOLUTION INTRAVENOUS at 13:43

## 2023-11-14 RX ADMIN — NASAL DECONGESTANT 2 SPRAY: 0.05 SPRAY NASAL at 01:47

## 2023-11-14 RX ADMIN — HYDROMORPHONE HYDROCHLORIDE 0.5 MG: 2 INJECTION INTRAMUSCULAR; INTRAVENOUS; SUBCUTANEOUS at 15:33

## 2023-11-14 RX ADMIN — IBUPROFEN 600 MG: 600 TABLET, FILM COATED ORAL at 07:32

## 2023-11-14 RX ADMIN — MORPHINE SULFATE 2 MG: 2 INJECTION, SOLUTION INTRAMUSCULAR; INTRAVENOUS at 01:47

## 2023-11-14 RX ADMIN — SENNOSIDES 8.6 MG: 8.6 TABLET, FILM COATED ORAL at 20:23

## 2023-11-14 RX ADMIN — ONDANSETRON 4 MG: 2 INJECTION INTRAMUSCULAR; INTRAVENOUS at 04:55

## 2023-11-14 RX ADMIN — FENTANYL CITRATE 50 MCG: 50 INJECTION, SOLUTION INTRAMUSCULAR; INTRAVENOUS at 15:31

## 2023-11-14 RX ADMIN — ACETAMINOPHEN 1000 MG: 500 TABLET ORAL at 06:13

## 2023-11-14 RX ADMIN — SODIUM CHLORIDE: 9 INJECTION, SOLUTION INTRAVENOUS at 11:00

## 2023-11-14 RX ADMIN — INSULIN GLARGINE 34 UNITS: 100 INJECTION, SOLUTION SUBCUTANEOUS at 20:23

## 2023-11-14 RX ADMIN — MORPHINE SULFATE 2 MG: 2 INJECTION, SOLUTION INTRAMUSCULAR; INTRAVENOUS at 11:34

## 2023-11-14 RX ADMIN — POLYETHYLENE GLYCOL (3350) 17 G: 17 POWDER, FOR SOLUTION ORAL at 10:54

## 2023-11-14 RX ADMIN — MORPHINE SULFATE 2 MG: 2 INJECTION, SOLUTION INTRAMUSCULAR; INTRAVENOUS at 04:56

## 2023-11-14 RX ADMIN — ROCURONIUM BROMIDE 10 MG: 10 INJECTION INTRAVENOUS at 15:17

## 2023-11-14 RX ADMIN — INSULIN LISPRO 6 UNITS: 100 INJECTION, SOLUTION INTRAVENOUS; SUBCUTANEOUS at 08:40

## 2023-11-14 RX ADMIN — SUCCINYLCHOLINE CHLORIDE 140 MG: 20 INJECTION, SOLUTION INTRAMUSCULAR; INTRAVENOUS at 15:17

## 2023-11-14 RX ADMIN — PIPERACILLIN AND TAZOBACTAM 3375 MG: 3; .375 INJECTION, POWDER, LYOPHILIZED, FOR SOLUTION INTRAVENOUS at 20:22

## 2023-11-14 RX ADMIN — SODIUM CHLORIDE: 9 INJECTION, SOLUTION INTRAVENOUS at 20:29

## 2023-11-14 RX ADMIN — PROPOFOL 200 MG: 10 INJECTION, EMULSION INTRAVENOUS at 15:17

## 2023-11-14 RX ADMIN — PIPERACILLIN AND TAZOBACTAM 4500 MG: 4; .5 INJECTION, POWDER, LYOPHILIZED, FOR SOLUTION INTRAVENOUS at 07:34

## 2023-11-14 RX ADMIN — ONDANSETRON HYDROCHLORIDE 4 MG: 2 INJECTION, SOLUTION INTRAMUSCULAR; INTRAVENOUS at 15:31

## 2023-11-14 ASSESSMENT — PAIN DESCRIPTION - DESCRIPTORS
DESCRIPTORS: DISCOMFORT
DESCRIPTORS: ACHING

## 2023-11-14 ASSESSMENT — PAIN SCALES - GENERAL
PAINLEVEL_OUTOF10: 8
PAINLEVEL_OUTOF10: 3
PAINLEVEL_OUTOF10: 2
PAINLEVEL_OUTOF10: 7
PAINLEVEL_OUTOF10: 4
PAINLEVEL_OUTOF10: 7
PAINLEVEL_OUTOF10: 8

## 2023-11-14 ASSESSMENT — PAIN DESCRIPTION - ORIENTATION
ORIENTATION: MID
ORIENTATION: ANTERIOR
ORIENTATION: MID

## 2023-11-14 ASSESSMENT — PAIN DESCRIPTION - LOCATION
LOCATION: RECTUM
LOCATION: BUTTOCKS
LOCATION: RECTUM

## 2023-11-14 ASSESSMENT — PAIN - FUNCTIONAL ASSESSMENT
PAIN_FUNCTIONAL_ASSESSMENT: 0-10
PAIN_FUNCTIONAL_ASSESSMENT: ACTIVITIES ARE NOT PREVENTED
PAIN_FUNCTIONAL_ASSESSMENT: 0-10

## 2023-11-14 NOTE — ANESTHESIA PRE PROCEDURE
in this interval not displayed. Coags: No results found for: \"PROTIME\", \"INR\", \"APTT\"    HCG (If Applicable): No results found for: \"PREGTESTUR\", \"PREGSERUM\", \"HCG\", \"HCGQUANT\"     ABGs:   Lab Results   Component Value Date/Time    HUZ1CRO 20 11/14/2023 07:55 AM        Type & Screen (If Applicable):  No results found for: \"LABABO\", \"LABRH\"    Drug/Infectious Status (If Applicable):  No results found for: \"HIV\", \"HEPCAB\"    COVID-19 Screening (If Applicable):   Lab Results   Component Value Date/Time    COVID19 Not detected 11/14/2023 07:42 AM           Anesthesia Evaluation  Patient summary reviewed and Nursing notes reviewed  Airway: Mallampati: II  TM distance: >3 FB   Neck ROM: full  Mouth opening: > = 3 FB   Dental: normal exam         Pulmonary:Negative Pulmonary ROS and normal exam                               Cardiovascular:Negative CV ROS  Exercise tolerance: good (>4 METS),           Rhythm: regular  Rate: normal                    Neuro/Psych:   Negative Neuro/Psych ROS              GI/Hepatic/Renal: Neg GI/Hepatic/Renal ROS            Endo/Other:    (+) DiabetesType I DM, using insulin, malignancy/cancer (rectal mass). Abdominal:             Vascular: negative vascular ROS. Other Findings:           Anesthesia Plan      general     ASA 3       Induction: intravenous. MIPS: Postoperative opioids intended and Prophylactic antiemetics administered. Anesthetic plan and risks discussed with patient. Plan discussed with CRNA.                     Daniel Ignacio MD   11/14/2023

## 2023-11-14 NOTE — ED NOTES
Pt has Omnipod insulin pump, posterior upper left arm. Pt states pump is replaced every 3 days. Pt states he last replaced pump on Sunday.   Pt states insulin is only medication he is taking     Poli Ewing., RN  11/14/23 0232       Yumi HUNT RN  11/14/23 [id number] [facility], Sebas Oviedo., RHETT  11/14/23 0044

## 2023-11-14 NOTE — H&P
Hospitalist Admission Note    NAME:   Jonas Arguelles   : 1970   MRN: [id number]     Date/Time: 2023 10:03 AM    Patient PCP: Stephan Roblero MD    ______________________________________________________________________  Given the patient's current clinical presentation, I have a high level of concern for decompensation if discharged from the emergency department. Complex decision making was performed, which includes reviewing the patient's available past medical records, laboratory results, and x-ray films. My assessment of this patient's clinical condition and my plan of care is as follows. 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 Partially imaged anal mass. Carcinoma favored over abscess. This should be  relatively evident on physical examination. 2. Segment 7 hepatic mass is probably a hemangioma. Liver protocol CT or MRI is  recommended for confirmation. This can be performed on a nonemergent, outpatient  basis. 3. Gallstones and cystic duct stones. Otherwise normal gallbladder.   Colorectal surgery consulted  Keep NPO pending colorectal plans  Cont IV Vanco, Zosyn  Appreciate pharmacy assistance w vancomycin dosing  Pain management, PRN antipyretics, antiemetics  Bowel regimen  Currently voiding - if concern for urinary retention, bladder scan and notify provider    Anorexia, unintentional weight loss ~10 lbs  Constipation, nausea without vomiting  Suspect dehydration  IV hydration NS @ 75 x24h  Encourage PO intake as tolerated once cleared for diet  Anti-emetics, bowel regimen  Dietician consult    Hypomagnesemia Mg 1.3 - repleted in ED, recheck BMP    Hypertension, tachycardia - resolved - likely 2/2 pain and infection  Telemetry monitoring  Anti-hypertensives PRN    Type 1 DM - insulin pump  Hyperglycemia without DKA/HHS  Received Insulin Reg 10 units for BS 200s-300s  Pt

## 2023-11-14 NOTE — PROGRESS NOTES
[facility] Dosing Services: Antimicrobial Stewardship    Physician: Cirilo Ramirez was automatically dose-adjusted per P&T Committee Protocol     Previous Regimen Piperacillin-Tazobactam 3,375 mg IV now   Creatinine Clearance Estimated Creatinine Clearance: 131 mL/min (based on SCr of 0.7 mg/dL).      Dosage changed to:  Loading dose piperacillin-tazobactam 4500 mg IV now    Thank you,     Carlota Hicks, PharmD   Contact: [phone]

## 2023-11-14 NOTE — ED NOTES
Pt reports feeling nauseous. MD notified of pt's complaint and BP reading.      Marisa Thomas., RHETT  11/14/23 5482

## 2023-11-14 NOTE — ED NOTES
Pt ambulatory to restroom for BM, pt reports successful BM but painful     Jose E Benson., RN  11/14/23 [address] [facility] Samir Garcia., RN  11/14/23 9181

## 2023-11-14 NOTE — BRIEF OP NOTE
Brief Postoperative Note      Patient: Nydia Roman  YOB: 1970  MRN: [id number]    Date of Procedure: 11/14/2023    Pre-Op Diagnosis Codes:     * Rectal mass [K62.89]    Post-Op Diagnosis:  Perirectal abscess       Procedure(s):  RECTAL EXAM UNDER ANESTHESIA, INCISION AND DRAINAGE OF PERIRECTAL ABSCESS, BIOPSY, AND DRAIN PLACEMENT    Surgeon(s):  Festus Duque MD    Assistant:  none    Anesthesia: General    Estimated Blood Loss (mL): Minimal    Complications: None    Specimens:   ID Type Source Tests Collected by Time Destination   A : rectal abscess Swab Rectum CULTURE, WOUND Rachel Lilong Salazar II, MD 11/14/2023 1535        Implants:  * No implants in log *      Drains: * No LDAs found *    Findings: No mucosal mass.   Large abscess in the left ischiorectal fossa with horseshoe extension anteriorly      Electronically signed by Caty Ruiz MD on 11/14/2023 at 3:42 PM

## 2023-11-14 NOTE — DIABETES MGMT
[address]  DIABETES MANAGEMENT CONSULT    Consulted by Provider for advanced nursing evaluation and care for inpatient blood glucose management. Evaluation and Action Plan   This 46year old male patient with Type 1 diabetes did not achieve BG control prior to admission. The patient reports being dx with diabetes approx 5 years ago. He uses an omnipod insulin pump. The patient Omnipod was about to run out and instead of resuming the insulin pump today, he will wait and we will use subQ insulin injections instead. I will start a basal dose tonight that is comparable to his total daily dose on his insulin pump. The patient is being taken down for surgery  ( renal mass). I will see this patient again in the morning. Management Rationale Action Plan   Medication   Basal needs Using home dose on insulin pump Stop insulin pump use and use 34 units Lantus nightly for now. Corrective insulin Using medium dose  sensitivity based on weight    Additional orders          Diabetes Discharge Plan   Medication  TBD   Referral  []        Outpatient diabetes education   Additional orders            Initial Presentation   Liliane Tony is a 46 y.o. male admitted  after experiencing illness, generalized weakness and cold sweats x 7-10 days. The patient also reports weight loss due to poor appetite. Dunia Godfrey LAB: Glucose 385. A1c  10%  CXR:  CT:INDICATION: Rectal pain, groin pain, Aida gangrene. COMPARISON: None. TECHNIQUE: CT of the abdomen and pelvis was performed after the administration  of 100 cc IV Isovue-370. CT dose reduction was achieved through use of a  standardized protocol tailored for this examination and automatic exposure  control for dose modulation. FINDINGS:  Inferior chest: The lung bases are clear. The heart is mildly enlarged. Left  anterior descending coronary calcifications are severe and left circumflex  coronary calcifications are mild.      Liver: A tiny Nursing Intervention Domain 5250 Decision-making Support   Nursing Interventions Examined current inpatient diabetes/blood glucose control   Explored factors facilitating and impeding inpatient management  Explored corrective strategies with patient and responsible inpatient provider   Informed patient of rational for insulin strategy while hospitalized     Nursing Diagnosis 55398 Ineffective Health Management   Nursing Intervention Domain 5256 Decision-making Support   Nursing Interventions Identified diabetes self-management practices impeding diabetes control  Discussed diabetes survival skills related to  Healthy Plate eating plan; given handouts  Role of physical activity in improving insulin sensitivity and action  Procedure for blood glucose monitoring & options for low-cost products  Medications plan at discharge     Billing Code(s)   [] [phone] IP subsequent hospital care - 50 minutes   [] [phone] IP subsequent hospital care - 35 minutes   [] [phone] IP subsequent hospital care - 25 minutes   [x] 94750 IP initial hospital care - 40 minutes     Before making these care recommendations, I personally reviewed the hospitalization record, including notes, laboratory & diagnostic data and current medications, and examined the patient at the bedside (circumstances permitting) before determining care. More than fifty (50) percent of the time was spent in patient counseling and/or care coordination.   Total minutes: 40 minutes    PRIMITIVO Beckham - CNS  Diabetes Clinical Nurse Specialist  [facility] Diabetes [facility]  Access via [facility]

## 2023-11-14 NOTE — ED NOTES
TRANSFER - OUT REPORT:    Verbal report given to Pamela Lopez RN on Saud Arora  being transferred to [facility] ED for routine progression of patient care       Report consisted of patient's Situation, Background, Assessment and   Recommendations(SBAR). Information from the following report(s) ED Encounter Summary, ED SBAR, [facility], and Recent Results was reviewed with the receiving nurse. [place] Assessment:    Presents to emergency department  because of falls (Syncope, seizure, or loss of consciousness): No  Age > 70: No  Altered Mental Status, Intoxication with alcohol or substance confusion (Disorientation, impaired judgment, poor safety awaremess, or inability to follow instructions): No  Impaired Mobility: Ambulates or transfers with assistive devices or assistance; Unable to ambulate or transer.: No  Nursing Judgement: No          Lines:   Peripheral IV 11/13/23 Right Antecubital (Active)   Site Assessment Clean, dry & intact 11/13/23 1651   Line Status Blood return noted;Normal saline locked;Specimen collected 11/13/23 1651   Phlebitis Assessment No symptoms 11/13/23 1651   Infiltration Assessment 0 11/13/23 1651   Dressing Status New dressing applied 11/13/23 1651   Dressing Type Transparent 11/13/23 1651   Dressing Intervention New 11/13/23 1651        Opportunity for questions and clarification was provided.       Patient transported with:  Monitor via Jeannette Tavera  11/14/23 9624

## 2023-11-14 NOTE — ED NOTES
Verbal shift change report given to Jane Yang RN (oncoming nurse) by Cornelius Charles RN (offgoing nurse). Report included the following information ED SBAR, MAR, and Recent Results.         Magdiel Burton, RHETT  11/13/23 1901

## 2023-11-14 NOTE — ED NOTES
Colorectal surgical consult called to Dr. Jam Emmanuel.      Hayes Jay RN  11/14/23 1531
Provider at bedside. Dr. Breana Edouard called and confirmed that consult was received.      Teddy Elizondo RN  11/14/23 9431
Pt advised of Diet NPO until consult with colorectal surgeon.      Sachin Giraldo RN  11/14/23 6957
Rick Served NP Myles the following:    \"Pt reports he doesn't have enough of his pt supply of Humalog to self administer. \"     Delaney Osborn RN  11/14/23 1116
Rick served Miko Garcia concerning consult for diabetes management of a pt with an insulin pump.      Mike Sahu RN  11/14/23 5359
Verbal report given to RN Minus Player who advised she will be down shortly to transport pt to pre-op.      Sandra Negron RN  11/14/23 5139
X-ray at bedside.      Cindy Polanco RN  11/14/23 9300
Acute blood loss as cause of postoperative anemia
Breech birth

## 2023-11-14 NOTE — ED PROVIDER NOTES
[facility] EMERGENCY DEPT  EMERGENCY DEPARTMENT ENCOUNTER       Pt Name: Jonas Arguelles  MRN: [id number]  [address] [facility] [place] 1970  Date of evaluation: 11/14/2023  Provider: Anette Michael MD   PCP: Stephan Roblero MD  Note Started: 7:28 AM EST 11/14/23     CHIEF COMPLAINT       Chief Complaint   Patient presents with    Rectal Pain     Pt presents to ED via EMS as a transfer from [facility]. EMS reports pt c/o rectal pain since Thursday. CT revealed anal mass and possible carcinoma. H/o T1DM. HISTORY OF PRESENT ILLNESS: 1 or more elements      History From: patient, EMS, chart review, History limited by: none     Jonas Arguelles is a 46 y.o. male with PMHx of DM type 1 on metformin and Omnipod insulin who presents in transfer from [facility] for known rectal mass, dehydration, and uncontrolled hyperglycemia. Patient reports that for the past week he has had pain and difficulty with bowel movements as well as urination and thought that he had hemorrhoids causing him to seek evaluation. He denies known fevers, abdominal pain, emesis, hematochezia, melena. He also endorsed fatigue, night sweats, nausea, and lightheadedness occurring for the past week as well. Patient had sepsis work-up performed including blood cultures, point-of-care lactate, CBC, CMP, urinalysis performed at OSH demonstrating leukocytosis of 13.9. Patient also had a abdomen/pelvis CT with contrast that demonstrated a partially imaged anal mass, carcinoma favored over abscess. He was empirically covered with vancomycin and Zosyn last given at 2100 yesterday. Patient's blood sugar ran as high as 406 and has remained greater than 290 throughout his hospital duration. The patient was discussed with Dr. Sabas Lopez, [facility] hospitalist and already accepted in transfer for admission. Notably patient states that he travels frequently for work all over the country, but is unaware of any possible exposures.        Please See MDM for Additional Details

## 2023-11-15 ENCOUNTER — APPOINTMENT (OUTPATIENT)
Facility: HOSPITAL | Age: 53
DRG: 330 | End: 2023-11-15
Payer: COMMERCIAL

## 2023-11-15 LAB
ANION GAP SERPL CALC-SCNC: 9 MMOL/L (ref 5–15)
BASOPHILS # BLD: 0.1 K/UL (ref 0–0.1)
BASOPHILS NFR BLD: 1 % (ref 0–1)
BUN SERPL-MCNC: 8 MG/DL (ref 6–20)
BUN/CREAT SERPL: 9 (ref 12–20)
CALCIUM SERPL-MCNC: 9 MG/DL (ref 8.5–10.1)
CHLORIDE SERPL-SCNC: 103 MMOL/L (ref 97–108)
CHOLEST SERPL-MCNC: 155 MG/DL
CO2 SERPL-SCNC: 22 MMOL/L (ref 21–32)
CREAT SERPL-MCNC: 0.85 MG/DL (ref 0.7–1.3)
DIFFERENTIAL METHOD BLD: ABNORMAL
EKG ATRIAL RATE: 98 BPM
EKG DIAGNOSIS: NORMAL
EKG P AXIS: 33 DEGREES
EKG P-R INTERVAL: 114 MS
EKG Q-T INTERVAL: 344 MS
EKG QRS DURATION: 76 MS
EKG QTC CALCULATION (BAZETT): 439 MS
EKG R AXIS: 29 DEGREES
EKG T AXIS: 31 DEGREES
EKG VENTRICULAR RATE: 98 BPM
EOSINOPHIL # BLD: 0 K/UL (ref 0–0.4)
EOSINOPHIL NFR BLD: 0 % (ref 0–7)
ERYTHROCYTE [DISTWIDTH] IN BLOOD BY AUTOMATED COUNT: 11.3 % (ref 11.5–14.5)
EST. AVERAGE GLUCOSE BLD GHB EST-MCNC: 235 MG/DL
GLUCOSE BLD STRIP.AUTO-MCNC: 281 MG/DL (ref 65–117)
GLUCOSE BLD STRIP.AUTO-MCNC: 301 MG/DL (ref 65–117)
GLUCOSE BLD STRIP.AUTO-MCNC: 321 MG/DL (ref 65–117)
GLUCOSE BLD STRIP.AUTO-MCNC: 366 MG/DL (ref 65–117)
GLUCOSE SERPL-MCNC: 354 MG/DL (ref 65–100)
HBA1C MFR BLD: 9.8 % (ref 4–5.6)
HCT VFR BLD AUTO: 41 % (ref 36.6–50.3)
HDLC SERPL-MCNC: 26 MG/DL
HDLC SERPL: 6 (ref 0–5)
HGB BLD-MCNC: 13.9 G/DL (ref 12.1–17)
IMM GRANULOCYTES # BLD AUTO: 0.1 K/UL (ref 0–0.04)
IMM GRANULOCYTES NFR BLD AUTO: 1 % (ref 0–0.5)
LDLC SERPL CALC-MCNC: 83.6 MG/DL (ref 0–100)
LYMPHOCYTES # BLD: 1.3 K/UL (ref 0.8–3.5)
LYMPHOCYTES NFR BLD: 9 % (ref 12–49)
MCH RBC QN AUTO: 31.4 PG (ref 26–34)
MCHC RBC AUTO-ENTMCNC: 33.9 G/DL (ref 30–36.5)
MCV RBC AUTO: 92.8 FL (ref 80–99)
MONOCYTES # BLD: 1.2 K/UL (ref 0–1)
MONOCYTES NFR BLD: 9 % (ref 5–13)
NEUTS SEG # BLD: 11.5 K/UL (ref 1.8–8)
NEUTS SEG NFR BLD: 80 % (ref 32–75)
NRBC # BLD: 0 K/UL (ref 0–0.01)
NRBC BLD-RTO: 0 PER 100 WBC
PLATELET # BLD AUTO: 314 K/UL (ref 150–400)
PMV BLD AUTO: 9.5 FL (ref 8.9–12.9)
POTASSIUM SERPL-SCNC: 4.5 MMOL/L (ref 3.5–5.1)
RBC # BLD AUTO: 4.42 M/UL (ref 4.1–5.7)
SERVICE CMNT-IMP: ABNORMAL
SODIUM SERPL-SCNC: 134 MMOL/L (ref 136–145)
TRIGL SERPL-MCNC: 227 MG/DL
VLDLC SERPL CALC-MCNC: 45.4 MG/DL
WBC # BLD AUTO: 14.2 K/UL (ref 4.1–11.1)

## 2023-11-15 PROCEDURE — 2580000003 HC RX 258

## 2023-11-15 PROCEDURE — 85025 COMPLETE CBC W/AUTO DIFF WBC: CPT

## 2023-11-15 PROCEDURE — 6360000002 HC RX W HCPCS: Performed by: STUDENT IN AN ORGANIZED HEALTH CARE EDUCATION/TRAINING PROGRAM

## 2023-11-15 PROCEDURE — 82962 GLUCOSE BLOOD TEST: CPT

## 2023-11-15 PROCEDURE — 6360000002 HC RX W HCPCS

## 2023-11-15 PROCEDURE — 70498 CT ANGIOGRAPHY NECK: CPT

## 2023-11-15 PROCEDURE — 6370000000 HC RX 637 (ALT 250 FOR IP)

## 2023-11-15 PROCEDURE — 99231 SBSQ HOSP IP/OBS SF/LOW 25: CPT

## 2023-11-15 PROCEDURE — 83036 HEMOGLOBIN GLYCOSYLATED A1C: CPT

## 2023-11-15 PROCEDURE — 6370000000 HC RX 637 (ALT 250 FOR IP): Performed by: PSYCHIATRY & NEUROLOGY

## 2023-11-15 PROCEDURE — 1100000003 HC PRIVATE W/ TELEMETRY

## 2023-11-15 PROCEDURE — 6370000000 HC RX 637 (ALT 250 FOR IP): Performed by: SURGERY

## 2023-11-15 PROCEDURE — 99255 IP/OBS CONSLTJ NEW/EST HI 80: CPT | Performed by: PSYCHIATRY & NEUROLOGY

## 2023-11-15 PROCEDURE — 36415 COLL VENOUS BLD VENIPUNCTURE: CPT

## 2023-11-15 PROCEDURE — 80048 BASIC METABOLIC PNL TOTAL CA: CPT

## 2023-11-15 PROCEDURE — 93010 ELECTROCARDIOGRAM REPORT: CPT | Performed by: SPECIALIST

## 2023-11-15 PROCEDURE — 70551 MRI BRAIN STEM W/O DYE: CPT

## 2023-11-15 PROCEDURE — 6360000004 HC RX CONTRAST MEDICATION: Performed by: STUDENT IN AN ORGANIZED HEALTH CARE EDUCATION/TRAINING PROGRAM

## 2023-11-15 PROCEDURE — 80061 LIPID PANEL: CPT

## 2023-11-15 RX ORDER — HEPARIN SODIUM 5000 [USP'U]/ML
5000 INJECTION, SOLUTION INTRAVENOUS; SUBCUTANEOUS EVERY 8 HOURS SCHEDULED
Status: DISCONTINUED | OUTPATIENT
Start: 2023-11-15 | End: 2023-11-20 | Stop reason: HOSPADM

## 2023-11-15 RX ORDER — ATORVASTATIN CALCIUM 40 MG/1
40 TABLET, FILM COATED ORAL NIGHTLY
Status: DISCONTINUED | OUTPATIENT
Start: 2023-11-15 | End: 2023-11-20 | Stop reason: HOSPADM

## 2023-11-15 RX ORDER — ASPIRIN 325 MG
325 TABLET ORAL DAILY
Status: DISCONTINUED | OUTPATIENT
Start: 2023-11-15 | End: 2023-11-20 | Stop reason: HOSPADM

## 2023-11-15 RX ORDER — CLOPIDOGREL BISULFATE 75 MG/1
75 TABLET ORAL DAILY
Status: DISCONTINUED | OUTPATIENT
Start: 2023-11-15 | End: 2023-11-20 | Stop reason: HOSPADM

## 2023-11-15 RX ORDER — OXYCODONE HYDROCHLORIDE 5 MG/1
5 TABLET ORAL EVERY 6 HOURS PRN
Qty: 12 TABLET | Refills: 0 | Status: SHIPPED | OUTPATIENT
Start: 2023-11-15 | End: 2023-11-18

## 2023-11-15 RX ORDER — AMOXICILLIN AND CLAVULANATE POTASSIUM 875; 125 MG/1; MG/1
1 TABLET, FILM COATED ORAL 2 TIMES DAILY
Qty: 20 TABLET | Refills: 0 | Status: SHIPPED | OUTPATIENT
Start: 2023-11-15 | End: 2023-11-20 | Stop reason: SDUPTHER

## 2023-11-15 RX ORDER — INSULIN LISPRO 100 [IU]/ML
6 INJECTION, SOLUTION INTRAVENOUS; SUBCUTANEOUS
Status: DISCONTINUED | OUTPATIENT
Start: 2023-11-15 | End: 2023-11-16

## 2023-11-15 RX ORDER — POLYETHYLENE GLYCOL 3350 17 G/17G
17 POWDER, FOR SOLUTION ORAL DAILY
Qty: 256 G | Refills: 0 | Status: SHIPPED | OUTPATIENT
Start: 2023-11-15 | End: 2023-11-30

## 2023-11-15 RX ADMIN — OXYCODONE HYDROCHLORIDE AND ACETAMINOPHEN 1 TABLET: 5; 325 TABLET ORAL at 04:09

## 2023-11-15 RX ADMIN — SODIUM CHLORIDE, PRESERVATIVE FREE 10 ML: 5 INJECTION INTRAVENOUS at 21:18

## 2023-11-15 RX ADMIN — INSULIN LISPRO 6 UNITS: 100 INJECTION, SOLUTION INTRAVENOUS; SUBCUTANEOUS at 12:45

## 2023-11-15 RX ADMIN — PIPERACILLIN AND TAZOBACTAM 3375 MG: 3; .375 INJECTION, POWDER, LYOPHILIZED, FOR SOLUTION INTRAVENOUS at 12:46

## 2023-11-15 RX ADMIN — INSULIN LISPRO 8 UNITS: 100 INJECTION, SOLUTION INTRAVENOUS; SUBCUTANEOUS at 12:45

## 2023-11-15 RX ADMIN — CLOPIDOGREL BISULFATE 75 MG: 75 TABLET ORAL at 16:25

## 2023-11-15 RX ADMIN — SENNOSIDES 8.6 MG: 8.6 TABLET, FILM COATED ORAL at 21:17

## 2023-11-15 RX ADMIN — INSULIN LISPRO 6 UNITS: 100 INJECTION, SOLUTION INTRAVENOUS; SUBCUTANEOUS at 18:51

## 2023-11-15 RX ADMIN — Medication 1 AMPULE: at 08:43

## 2023-11-15 RX ADMIN — POLYETHYLENE GLYCOL (3350) 17 G: 17 POWDER, FOR SOLUTION ORAL at 08:43

## 2023-11-15 RX ADMIN — PIPERACILLIN AND TAZOBACTAM 3375 MG: 3; .375 INJECTION, POWDER, LYOPHILIZED, FOR SOLUTION INTRAVENOUS at 21:30

## 2023-11-15 RX ADMIN — OXYCODONE HYDROCHLORIDE AND ACETAMINOPHEN 1 TABLET: 5; 325 TABLET ORAL at 10:26

## 2023-11-15 RX ADMIN — IOPAMIDOL 100 ML: 755 INJECTION, SOLUTION INTRAVENOUS at 18:11

## 2023-11-15 RX ADMIN — ATORVASTATIN CALCIUM 40 MG: 40 TABLET, FILM COATED ORAL at 21:17

## 2023-11-15 RX ADMIN — INSULIN GLARGINE 34 UNITS: 100 INJECTION, SOLUTION SUBCUTANEOUS at 22:11

## 2023-11-15 RX ADMIN — SODIUM CHLORIDE, PRESERVATIVE FREE 10 ML: 5 INJECTION INTRAVENOUS at 08:43

## 2023-11-15 RX ADMIN — INSULIN LISPRO 6 UNITS: 100 INJECTION, SOLUTION INTRAVENOUS; SUBCUTANEOUS at 06:51

## 2023-11-15 RX ADMIN — HYDRALAZINE HYDROCHLORIDE 10 MG: 20 INJECTION INTRAMUSCULAR; INTRAVENOUS at 23:23

## 2023-11-15 RX ADMIN — ASPIRIN 325 MG: 325 TABLET ORAL at 16:25

## 2023-11-15 RX ADMIN — HEPARIN SODIUM 5000 UNITS: 5000 INJECTION INTRAVENOUS; SUBCUTANEOUS at 15:21

## 2023-11-15 RX ADMIN — PIPERACILLIN AND TAZOBACTAM 3375 MG: 3; .375 INJECTION, POWDER, LYOPHILIZED, FOR SOLUTION INTRAVENOUS at 04:07

## 2023-11-15 RX ADMIN — HEPARIN SODIUM 5000 UNITS: 5000 INJECTION INTRAVENOUS; SUBCUTANEOUS at 22:11

## 2023-11-15 RX ADMIN — MELATONIN 3 MG: at 22:11

## 2023-11-15 ASSESSMENT — PAIN DESCRIPTION - LOCATION
LOCATION: RECTUM
LOCATION: ABDOMEN

## 2023-11-15 ASSESSMENT — PAIN DESCRIPTION - DESCRIPTORS: DESCRIPTORS: ACHING

## 2023-11-15 ASSESSMENT — PAIN - FUNCTIONAL ASSESSMENT: PAIN_FUNCTIONAL_ASSESSMENT: ACTIVITIES ARE NOT PREVENTED

## 2023-11-15 ASSESSMENT — PAIN SCALES - GENERAL
PAINLEVEL_OUTOF10: 10
PAINLEVEL_OUTOF10: 4

## 2023-11-15 ASSESSMENT — PAIN DESCRIPTION - ORIENTATION: ORIENTATION: RIGHT

## 2023-11-15 NOTE — OP NOTE
[facility]  OPERATIVE REPORT    Name:  Gallo Squires  MR#:  [id number]  :  1970  ACCOUNT #:  [de-identified]  DATE OF SERVICE:  2023      PREOPERATIVE DIAGNOSIS:  Rectal mass. POSTOPERATIVE DIAGNOSIS:  Perirectal abscess. PROCEDURE PERFORMED:  Rectal exam under anesthesia, incision and drainage of perirectal abscess, biopsy and drain placement. SURGEON:  Cas Petersen MD    ASSISTANT:  None. ANESTHESIA:  General.    COMPLICATIONS:  None. SPECIMENS REMOVED:  Rectal abscess for culture, Gram stain and pathology. IMPLANTS:  None. ESTIMATED BLOOD LOSS:  Minimal.    DRAINS:  Quarter-inch Penrose drain. FINDINGS:  No mucosal mass, large abscess in the left ischiorectal fossa with horseshoe extension anteriorly. INDICATIONS:  This is a 50-year-old male who has never had a colonoscopy, he presents with perirectal pain and swelling. He had a CT that showed a concern for a possible neoplasm in the rectum. I was unable to examine him at the bedside due to severe discomfort. Therefore, he was taken to the operating room for an exam under anesthesia, possible biopsy and drainage and washout. DESCRIPTION OF PROCEDURE:  The patient was brought to the operating suite, placed in the supine position. General endotracheal anesthesia was induced. Venodyne stockings were placed. He was then placed in a prone Jackknife position. His buttocks were taped apart and the perianal area was prepped and draped in the usual sterile fashion. A time-out was performed indicating the correct patient and procedure to be performed as per hospital protocol. A digital rectal exam was performed and anoscopy. There was no evidence of any mucosal lesions or mass whatsoever. The patient did have a large abscess in the left ischiorectal fossa with ischemic skin changes on the surface. It was very firm and indurated.   An incision was made along this area and a large amount of pus was

## 2023-11-15 NOTE — PROGRESS NOTES
Hospitalist note     At 11: 30 , patient had noticed weakness on his rt leg - sudden onset and had remained statis . He did mention he had tingling sensation on the rt upper limb in the past - about 3 weeks ago     On examination :   Lower limb rt - power 0/5 , tone decreased , reflexes - + on knee and absent on ankle     Plan :   -urgent MRI of brain was ordered   - stroke code was not called   - neurology was consulted     At 3: 50 patient was revisited with neurology in room   He had persistent symptoms and signs of weakness on rt lower extremity     MRI RESULTED : Diffusion imaging show a small right side frontal parietal white matter infarct likely subacute. diffusion imaging also show some acute ischemic changes in the a left parafalcine frontal parietal convexity vascular distribution left anterior cerebral artery compatible with acute ischemia.     PLAN :   - transfer patient to neuro tele unit   - NIHSS as per unit ( would advise every 4 hours for 24 hours )   - pt/ot eval and treat   - neurology advised against tpa   - will get cta head and neck , us carotid dupplex ,  and echocardiogram   - aspirin , plavix and statin as per neuro      End note

## 2023-11-15 NOTE — PROGRESS NOTES
10:30: Pt up to the bathroom, Pt complaining of intermittent tremors in R leg on the way back to bed. He states \"I feel like it will give up\". Pt put back in bed. 10:33: MD notified of tremors in R leg when standing up. 11:33: MD and nurse at bedside. NIH completed by MD and nurse. 11:55: repeat NIH completed. 11:57: MRI screening form completed and faxed. 15:14: pt returned to room after MRI in bed. Pt complains of numbness in R hip. When asked when it started, pt states \"I don't know\". MD notified - perfect serve  NIH completed.

## 2023-11-15 NOTE — CONSULTS
[address] [facility]  [place], [address]    Neurology Consultation    Date: November 15, 2023    Dequan Núñez  [id number]  1970  [address] [address]    Primary Care Physician:  Chris Lux MD  [unfilled]  [phone]   [phone]    Referring Physician:  Mercedes Huerta MD    Impression: This patient suffered a left cerebral infarction with looks to be roughly in a watershed distribution affecting probably the lower right leg. There is a questionable area of diffusion abnormality in the right hemisphere as well in the subcortical white matter but without an associated ADC map abnormality. He reports having some symptoms with his right leg some days ago and I am somewhat concerned he has a high-grade left carotid stenosis. Plan and Recommendations: We will start him on dual antiplatelet therapy and a statin. We will get a stat CTA of the head and neck. We will order an echocardiogram as well as PT and OT evaluations. He is outside of the window for thrombolytic now 4 and half hours after the onset of his symptoms. Barrier to Discharge from Neurologic Perspective: Completion of diagnostic work-up and close monitoring the next couple of days. Outpatient Neurology Follow Up: To be determined. Ankur Montero M.D. Diplomate, American Board of Neurology and Psychiatry  Diplomate, American Board of Electrodiagnostic Medicine  Subspecialty Certification, Headache Medicine, [facility]  ______________________________________________________________________    Reason for Consultation: Dequan Núñez is a 46 y.o. y/o male who we are asked to see in consultation for right leg weakness. History of Present Illness: This gentleman is seen just around 330 to 4 PM.  He had the onset of progressive right lower extremity numbness and weakness beginning around 1130 this morning.   It started in the leg and gradually over time seem to affect the Final    Eosinophils Absolute 11/15/2023 0.0  0.0 - 0.4 K/UL Final    Basophils Absolute 11/15/2023 0.1  0.0 - 0.1 K/UL Final    Absolute Immature Granulocyte 11/15/2023 0.1 (H)  0.00 - 0.04 K/UL Final    Differential Type 11/15/2023 AUTOMATED    Final    Hemoglobin A1C 11/15/2023 9.8 (H)  4.0 - 5.6 % Final    eAG 11/15/2023 235  mg/dL Final    POC Glucose 11/14/2023 381 (H)  65 - 117 mg/dL Final    Performed by: 11/14/2023 Awais Gibson RN   Final    POC Glucose 11/15/2023 301 (H)  65 - 117 mg/dL Final    Performed by: 11/15/2023 Awais Gibson RN   Final    POC Glucose 11/15/2023 366 (H)  65 - 117 mg/dL Final    Performed by: 11/15/2023 Quiana Pena PCT   Final   Admission on 11/13/2023, Discharged on 11/14/2023   Component Date Value Ref Range Status    WBC 11/13/2023 13.9 (H)  4.1 - 11.1 K/uL Final    RBC 11/13/2023 4.79  4.10 - 5.70 M/uL Final    Hemoglobin 11/13/2023 15.0  12.1 - 17.0 g/dL Final    Hematocrit 11/13/2023 43.2  36.6 - 50.3 % Final    MCV 11/13/2023 90.2  80.0 - 99.0 FL Final    MCH 11/13/2023 31.3  26.0 - 34.0 PG Final    MCHC 11/13/2023 34.7  30.0 - 36.5 g/dL Final    RDW 11/13/2023 11.0 (L)  11.5 - 14.5 % Final    Platelets 55/08/0953 327  150 - 400 K/uL Final    MPV 11/13/2023 9.8  8.9 - 12.9 FL Final    Nucleated RBCs 11/13/2023 0.0  0  WBC Final    nRBC 11/13/2023 0.00  0.00 - 0.01 K/uL Final    Neutrophils % 11/13/2023 79 (H)  32 - 75 % Final    Lymphocytes % 11/13/2023 10 (L)  12 - 49 % Final    Monocytes % 11/13/2023 10  5 - 13 % Final    Eosinophils % 11/13/2023 0  0 - 7 % Final    Basophils % 11/13/2023 0  0 - 1 % Final    Immature Granulocytes 11/13/2023 1 (H)  0.0 - 0.5 % Final    Neutrophils Absolute 11/13/2023 11.0 (H)  1.8 - 8.0 K/UL Final    Lymphocytes Absolute 11/13/2023 1.3  0.8 - 3.5 K/UL Final    Monocytes Absolute 11/13/2023 1.3 (H)  0.0 - 1.0 K/UL Final    Eosinophils Absolute 11/13/2023 0.0  0.0 - 0.4 K/UL Final    Basophils Absolute 11/13/2023 0.0  0.0 - 0.1 K/UL BLOOD    Final    Performed by: 11/13/2023 Janet Leonard EDT   Final    POC Glucose 11/13/2023 308 (H)  65 - 117 mg/dL Final    Performed by: 11/13/2023 Nyla Green LPN   Final    POC Glucose 11/13/2023 323 (H)  65 - 117 mg/dL Final    Performed by: 11/13/2023 Jey Lips   Final    POC Glucose 11/13/2023 319 (H)  65 - 117 mg/dL Final    Performed by: 11/13/2023 Nyla Green LPN   Final    POC Glucose 11/14/2023 293 (H)  65 - 117 mg/dL Final    Performed by: 11/14/2023 Jey Lips   Final    Accession Number 11/13/2023 [id number]   Final    Enterococcus faecalis by PCR 11/13/2023 Not detected  NOTD   Final    Enterococcus faecium by PCR 11/13/2023 Not detected  NOTD   Final    Listeria monocytogenes by PCR 11/13/2023 Not detected  NOTD   Final    STAPHYLOCOCCUS 11/13/2023 Not detected  NOTD   Final    Staphylococcus Aureus 11/13/2023 Not detected  NOTD   Final    Staphylococcus epidermidis by PCR 11/13/2023 Not detected  NOTD   Final    Staphylococcus lugdunensis by PCR 11/13/2023 Not detected  NOTD   Final    STREPTOCOCCUS 11/13/2023 Not detected  NOTD   Final    Streptococcus agalactiae (Group B) 11/13/2023 Not detected  NOTD   Final    Strep pneumoniae 11/13/2023 Not detected  NOTD   Final    Strep pyogenes,(Grp. A) 11/13/2023 Not detected  NOTD   Final    Acinetobacter calcoac baumannii co* 11/13/2023 Not detected  NOTD   Final    Bacteroides fragilis by PCR 11/13/2023 Not detected  NOTD   Final    Enterobacteriaceae by PCR 11/13/2023 Detected (A)  NOTD   Final    Enterobacter cloacae complex by PCR 11/13/2023 Not detected  NOTD   Final    Escherichia Coli 11/13/2023 Not detected  NOTD   Final    Klebsiella aerogenes by PCR 11/13/2023 Not detected  NOTD   Final    Klebsiella oxytoca by PCR 11/13/2023 Not detected  NOTD   Final    Klebsiella pneumoniae group by PCR 11/13/2023 Detected (A)  NOTD   Final    Proteus by PCR 11/13/2023 Not detected  NOTD   Final    Salmonella species by PCR 11/13/2023

## 2023-11-15 NOTE — DIABETES MGMT
[address] [facility]  DIABETES MANAGEMENT CONSULT    Consulted by Provider for advanced nursing evaluation and care for inpatient blood glucose management. Evaluation and Action Plan   This 46year old male patient with Type 1 diabetes did not achieve BG control prior to admission. The patient reports being dx with diabetes approx 5 years ago. He uses an omnipod insulin pump. The patient Omnipod was about to run out and instead of resuming the insulin pump today, he will wait and we will use subQ insulin injections instead. I will start a basal dose tonight that is comparable to his total daily dose on his insulin pump. The patient is being taken down for surgery  ( renal mass). I will see this patient again in the morning. The patient reports doing well this morning. He is eating and tolerating. He received a basal insulin injection last evening, however BG's have been elevated today. I suspect pre-op steroid dose is the primary culprit. I have ordered schedule mealtime insulin since the patient is eating again. I will keep the basal dosing the same for now I suspect the effects of the steroid should dissipate soon. Management Rationale Action Plan   Medication   Basal needs Using home dose on insulin pump Stop insulin pump use and use 34 units Lantus nightly for now. Corrective insulin Using medium dose  sensitivity based on weight Start 6 units Humalog with each meal    Additional orders          Diabetes Discharge Plan   Medication  TBD   Referral  []        Outpatient diabetes education   Additional orders            Initial Presentation   French Acuna is a 46 y.o. male admitted  after experiencing illness, generalized weakness and cold sweats x 7-10 days. The patient also reports weight loss due to poor appetite. Evgeny Martinez LAB: Glucose 385. A1c  10%  CXR:  CT:INDICATION: Rectal pain, groin pain, Aida gangrene. COMPARISON: None.      TECHNIQUE: CT of the abdomen and pelvis was performed after the administration  of 100 cc IV Isovue-370. CT dose reduction was achieved through use of a  standardized protocol tailored for this examination and automatic exposure  control for dose modulation. FINDINGS:  Inferior chest: The lung bases are clear. The heart is mildly enlarged. Left  anterior descending coronary calcifications are severe and left circumflex  coronary calcifications are mild. Liver: A tiny hypodense hepatic lesion in segment 5 (914-83) is probably a cyst.  A 12 mm hypodense hepatic mass in the subcapsular aspect of segment 7 (497-35)  has a peripheral nodule of enhancement (3-23), suggesting a benign hemangioma. No clearly concerning hepatic mass. Abdomen: There are small stones in the gallbladder and cystic duct. The  gallbladder is otherwise normal. The distal esophagus, stomach, duodenum,  pancreas, spleen, adrenals, and kidneys are normal.     Anal mass: A heterogeneously hypodense, lobulated anal mass is partially imaged  at the inferior field of view. It measures at least 58 x 37 x 23 mm. It extends  from at least 11:00 to 5:00, around the left renal sphincter. Anal carcinoma is  favored over abscess. This should be readily evident on physical examination. No  pelvic lymphadenopathy. Pelvis: The small bowel, ileocecal junction, appendix, colon, and bladder are  normal. No free air or fluid, and no abdominopelvic lymphadenopathy. IMPRESSION:  1. Partially imaged anal mass. Carcinoma favored over abscess. This should be  relatively evident on physical examination. 2. Segment 7 hepatic mass is probably a hemangioma. Liver protocol CT or MRI is  recommended for confirmation. This can be performed on a nonemergent, outpatient  basis. 3. Gallstones and cystic duct stones. Otherwise normal gallbladder.          HX:   Past Medical History:   Diagnosis Date    Diabetes ([address])     IDDM        INITIAL DX: Hyperglycemia [R73.9]  Rectal mass [K62.89]     Current 11/14/23  0742 11/14/23  0755 11/14/23  0906 11/15/23  0407   *  --  136 134*   K 4.2  --  3.7 4.5     --  104 103   CO2 20*  --  22 22   BUN 6  --  6 8   CREATININE 0.79 0.6 0.78 0.85       Lab Results   Component Value Date    ALT 15 11/14/2023    AST 11 (L) 11/14/2023    ALKPHOS 65 11/14/2023    BILITOT 0.6 11/14/2023     No results found for: \"TSH\", \"TSHREFLEX\", \"TSHFT4\", \"TSHELE\", \"WER7TFE\", \"TSHHS\"  Lab Results   Component Value Date    LABA1C 9.8 (H) 11/15/2023    LABA1C 10.0 (H) 11/14/2023       Factors impacting BG management  Factor Dose Comments   Nutrition:  Standard meals     45 grams/meal      Pain Prn acetaminophen  PRN morphine  PRN percocet      Infection Zosyn WBC 13.9   Other:   Kidney function  Liver function     Normal  Normal      Blood glucose pattern        Assessment and Nursing Intervention   Nursing Diagnosis Risk for unstable blood glucose pattern   Nursing Intervention Domain 5250 Decision-making Support   Nursing Interventions Examined current inpatient diabetes/blood glucose control   Explored factors facilitating and impeding inpatient management  Explored corrective strategies with patient and responsible inpatient provider   Informed patient of rational for insulin strategy while hospitalized     Nursing Diagnosis 00724 Ineffective Health Management   Nursing Intervention Domain 5250 Decision-making Support   Nursing Interventions Identified diabetes self-management practices impeding diabetes control  Discussed diabetes survival skills related to  Healthy Plate eating plan; given handouts  Role of physical activity in improving insulin sensitivity and action  Procedure for blood glucose monitoring & options for low-cost products  Medications plan at discharge     Billing Code(s)   [] 72560 IP subsequent hospital care - 50 minutes   [] 10772 IP subsequent hospital care - 35 minutes   [x] 31965 IP subsequent hospital care - 25 minutes   [] [id number] IP initial hospital care -

## 2023-11-15 NOTE — PLAN OF CARE
Problem: Pain  Goal: Verbalizes/displays adequate comfort level or baseline comfort level  11/15/2023 1138 by Asif Camacho RN  Outcome: Progressing  11/14/2023 2225 by Kalyn Aguero RN  Outcome: Progressing     Problem: Chronic Conditions and Co-morbidities  Goal: Patient's chronic conditions and co-morbidity symptoms are monitored and maintained or improved  Outcome: Progressing     Problem: Discharge Planning  Goal: Discharge to home or other facility with appropriate resources  Outcome: Progressing     Problem: ABCDS Injury Assessment  Goal: Absence of physical injury  Outcome: Progressing

## 2023-11-15 NOTE — ANESTHESIA POSTPROCEDURE EVALUATION
Department of Anesthesiology  Postprocedure Note    Patient: Edinson Matias  MRN: [id number]  YOB: 1970  Date of evaluation: 11/15/2023      Procedure Summary     Date: 11/14/23 Room / Location: [facility] MAIN OR M4 / [facility] MAIN OR    Anesthesia Start: 1513 Anesthesia Stop: 1629    Procedure: RECTAL EXAM UNDER ANESTHESIA, BIOPSY OF RECTAL MASS AND WASHOUT (Anus) Diagnosis:       Rectal mass      (Rectal mass [K62.89])    Providers: Soha Callahan MD Responsible Provider: Arpit Head MD    Anesthesia Type: General ASA Status: 3          Anesthesia Type: General    Gaston Phase I: Gaston Score: 10    Gaston Phase II:        Anesthesia Post Evaluation    Patient location during evaluation: PACU  Patient participation: complete - patient participated  Level of consciousness: sleepy but conscious and responsive to verbal stimuli  Airway patency: patent  Nausea & Vomiting: no vomiting and no nausea  Complications: no  Cardiovascular status: blood pressure returned to baseline and hemodynamically stable  Respiratory status: acceptable  Hydration status: stable

## 2023-11-15 NOTE — CARE COORDINATION
Care Management Initial Assessment       RUR: 8%  Readmission? No  1st IM letter given? No  1st  letter given: No    CM completed assessment w/pt at bedside. No history of HH/Rehab or DME use. [place] the [facility] [place] in [address] uses the [facility] on [address] if needed. Patient had surgery yesterday and now has numbness in his right leg  A co-worker will transport pt at d/c.     11/15/23 2873   Service Assessment   Patient Orientation Alert and Oriented   Cognition Alert   History Provided By Patient   Primary [facility] Parent; Children; Other (Comment)  (mother, daughter, both in state of [place] & co-workers)   PCP Verified by [employer] Yes   Last Visit to PCP Within last year   Prior Functional Level Independent in ADLs/IADLs   Current Functional Level Independent in ADLs/IADLs; Other (see comment)  (pt currently has numbness in right leg)   Can patient return to prior living arrangement Yes   Family able to assist with home care needs: No  (pt is in [place] for work and fly's home to [place] every weekend)   Financial Resources Other (Comment)  ([employer])   Community Resources None   Social/Functional History   Lives With Alone   Type of [address] [place] [address] [employer] None   Active  Yes     Camila Souza  Ext [phone]

## 2023-11-15 NOTE — PROGRESS NOTES
End of Shift Note    Bedside shift change report given to Evaristo Melgar (oncoming nurse) by Edwin Montano RN (offgoing nurse). Report included the following information SBAR, Intake/Output, MAR, and Recent Results    Shift worked:  3705-2490     Shift summary and any significant changes:    POD 1. A&0 x4. On room air. Julieta diet. PRN pain regimen effective. IVF running. Voiding with no issues. Dry drsg changes to incision site for small ss bleeding. Hyperglycemia reported to provider with no further orders. Diet orders discussed with pt.      Concerns for physician to address:       Zone phone for oncoming shift:                 Length of Stay:  Expected LOS: 3  Actual LOS: 1      Edwin Montano, RN

## 2023-11-16 ENCOUNTER — APPOINTMENT (OUTPATIENT)
Facility: HOSPITAL | Age: 53
DRG: 330 | End: 2023-11-16
Attending: PSYCHIATRY & NEUROLOGY
Payer: COMMERCIAL

## 2023-11-16 LAB
ANION GAP SERPL CALC-SCNC: 9 MMOL/L (ref 5–15)
BACTERIA SPEC CULT: ABNORMAL
BASOPHILS # BLD: 0 K/UL (ref 0–0.1)
BASOPHILS NFR BLD: 0 % (ref 0–1)
BUN SERPL-MCNC: 9 MG/DL (ref 6–20)
BUN/CREAT SERPL: 12 (ref 12–20)
CALCIUM SERPL-MCNC: 8.7 MG/DL (ref 8.5–10.1)
CHLORIDE SERPL-SCNC: 100 MMOL/L (ref 97–108)
CO2 SERPL-SCNC: 27 MMOL/L (ref 21–32)
CREAT SERPL-MCNC: 0.73 MG/DL (ref 0.7–1.3)
DIFFERENTIAL METHOD BLD: ABNORMAL
ECHO AO ROOT DIAM: 3.2 CM
ECHO AO ROOT INDEX: 1.57 CM/M2
ECHO AV AREA PEAK VELOCITY: 3.1 CM2
ECHO AV AREA/BSA PEAK VELOCITY: 1.5 CM2/M2
ECHO AV PEAK GRADIENT: 9 MMHG
ECHO AV PEAK VELOCITY: 1.5 M/S
ECHO AV VELOCITY RATIO: 0.93
ECHO BSA: 2.09 M2
ECHO LA DIAMETER INDEX: 1.57 CM/M2
ECHO LA DIAMETER: 3.2 CM
ECHO LA TO AORTIC ROOT RATIO: 1
ECHO LV FRACTIONAL SHORTENING: 32 % (ref 28–44)
ECHO LV INTERNAL DIMENSION DIASTOLE INDEX: 1.86 CM/M2
ECHO LV INTERNAL DIMENSION DIASTOLIC: 3.8 CM (ref 4.2–5.9)
ECHO LV INTERNAL DIMENSION SYSTOLIC INDEX: 1.27 CM/M2
ECHO LV INTERNAL DIMENSION SYSTOLIC: 2.6 CM
ECHO LV IVSD: 1.1 CM (ref 0.6–1)
ECHO LV MASS 2D: 134.7 G (ref 88–224)
ECHO LV MASS INDEX 2D: 66 G/M2 (ref 49–115)
ECHO LV POSTERIOR WALL DIASTOLIC: 1.1 CM (ref 0.6–1)
ECHO LV RELATIVE WALL THICKNESS RATIO: 0.58
ECHO LVOT AREA: 3.5 CM2
ECHO LVOT DIAM: 2.1 CM
ECHO LVOT PEAK GRADIENT: 7 MMHG
ECHO LVOT PEAK VELOCITY: 1.4 M/S
EOSINOPHIL # BLD: 0.1 K/UL (ref 0–0.4)
EOSINOPHIL NFR BLD: 1 % (ref 0–7)
ERYTHROCYTE [DISTWIDTH] IN BLOOD BY AUTOMATED COUNT: 11.2 % (ref 11.5–14.5)
GLUCOSE BLD STRIP.AUTO-MCNC: 280 MG/DL (ref 65–117)
GLUCOSE BLD STRIP.AUTO-MCNC: 285 MG/DL (ref 65–117)
GLUCOSE BLD STRIP.AUTO-MCNC: 293 MG/DL (ref 65–117)
GLUCOSE BLD STRIP.AUTO-MCNC: 343 MG/DL (ref 65–117)
GLUCOSE BLD STRIP.AUTO-MCNC: 452 MG/DL (ref 65–117)
GLUCOSE SERPL-MCNC: 343 MG/DL (ref 65–100)
GRAM STN SPEC: ABNORMAL
HCT VFR BLD AUTO: 36.7 % (ref 36.6–50.3)
HGB BLD-MCNC: 13.1 G/DL (ref 12.1–17)
IMM GRANULOCYTES # BLD AUTO: 0.1 K/UL (ref 0–0.04)
IMM GRANULOCYTES NFR BLD AUTO: 1 % (ref 0–0.5)
LYMPHOCYTES # BLD: 2.4 K/UL (ref 0.8–3.5)
LYMPHOCYTES NFR BLD: 25 % (ref 12–49)
MAGNESIUM SERPL-MCNC: 1.6 MG/DL (ref 1.6–2.4)
MCH RBC QN AUTO: 32.3 PG (ref 26–34)
MCHC RBC AUTO-ENTMCNC: 35.7 G/DL (ref 30–36.5)
MCV RBC AUTO: 90.6 FL (ref 80–99)
MONOCYTES # BLD: 0.8 K/UL (ref 0–1)
MONOCYTES NFR BLD: 9 % (ref 5–13)
NEUTS SEG # BLD: 6.4 K/UL (ref 1.8–8)
NEUTS SEG NFR BLD: 64 % (ref 32–75)
NRBC # BLD: 0 K/UL (ref 0–0.01)
NRBC BLD-RTO: 0 PER 100 WBC
PLATELET # BLD AUTO: 318 K/UL (ref 150–400)
PMV BLD AUTO: 9.4 FL (ref 8.9–12.9)
POTASSIUM SERPL-SCNC: 3.3 MMOL/L (ref 3.5–5.1)
RBC # BLD AUTO: 4.05 M/UL (ref 4.1–5.7)
SERVICE CMNT-IMP: ABNORMAL
SODIUM SERPL-SCNC: 136 MMOL/L (ref 136–145)
WBC # BLD AUTO: 9.9 K/UL (ref 4.1–11.1)

## 2023-11-16 PROCEDURE — 6360000002 HC RX W HCPCS

## 2023-11-16 PROCEDURE — 6370000000 HC RX 637 (ALT 250 FOR IP)

## 2023-11-16 PROCEDURE — 83735 ASSAY OF MAGNESIUM: CPT

## 2023-11-16 PROCEDURE — 97530 THERAPEUTIC ACTIVITIES: CPT

## 2023-11-16 PROCEDURE — 6360000002 HC RX W HCPCS: Performed by: STUDENT IN AN ORGANIZED HEALTH CARE EDUCATION/TRAINING PROGRAM

## 2023-11-16 PROCEDURE — 82962 GLUCOSE BLOOD TEST: CPT

## 2023-11-16 PROCEDURE — 80048 BASIC METABOLIC PNL TOTAL CA: CPT

## 2023-11-16 PROCEDURE — 6370000000 HC RX 637 (ALT 250 FOR IP): Performed by: SURGERY

## 2023-11-16 PROCEDURE — 87040 BLOOD CULTURE FOR BACTERIA: CPT

## 2023-11-16 PROCEDURE — 1100000003 HC PRIVATE W/ TELEMETRY

## 2023-11-16 PROCEDURE — 6370000000 HC RX 637 (ALT 250 FOR IP): Performed by: PSYCHIATRY & NEUROLOGY

## 2023-11-16 PROCEDURE — 2580000003 HC RX 258

## 2023-11-16 PROCEDURE — 36415 COLL VENOUS BLD VENIPUNCTURE: CPT

## 2023-11-16 PROCEDURE — 99231 SBSQ HOSP IP/OBS SF/LOW 25: CPT

## 2023-11-16 PROCEDURE — 97530 THERAPEUTIC ACTIVITIES: CPT | Performed by: PHYSICAL THERAPIST

## 2023-11-16 PROCEDURE — 97535 SELF CARE MNGMENT TRAINING: CPT

## 2023-11-16 PROCEDURE — 85025 COMPLETE CBC W/AUTO DIFF WBC: CPT

## 2023-11-16 PROCEDURE — 97162 PT EVAL MOD COMPLEX 30 MIN: CPT | Performed by: PHYSICAL THERAPIST

## 2023-11-16 PROCEDURE — 97116 GAIT TRAINING THERAPY: CPT | Performed by: PHYSICAL THERAPIST

## 2023-11-16 PROCEDURE — 97166 OT EVAL MOD COMPLEX 45 MIN: CPT

## 2023-11-16 PROCEDURE — 99232 SBSQ HOSP IP/OBS MODERATE 35: CPT | Performed by: PSYCHIATRY & NEUROLOGY

## 2023-11-16 PROCEDURE — 93308 TTE F-UP OR LMTD: CPT

## 2023-11-16 RX ORDER — INSULIN LISPRO 100 [IU]/ML
10 INJECTION, SOLUTION INTRAVENOUS; SUBCUTANEOUS
Status: DISCONTINUED | OUTPATIENT
Start: 2023-11-16 | End: 2023-11-17

## 2023-11-16 RX ORDER — INSULIN LISPRO 100 [IU]/ML
10 INJECTION, SOLUTION INTRAVENOUS; SUBCUTANEOUS
Status: DISCONTINUED | OUTPATIENT
Start: 2023-11-16 | End: 2023-11-16

## 2023-11-16 RX ORDER — INSULIN GLARGINE 100 [IU]/ML
40 INJECTION, SOLUTION SUBCUTANEOUS NIGHTLY
Status: DISCONTINUED | OUTPATIENT
Start: 2023-11-16 | End: 2023-11-17

## 2023-11-16 RX ADMIN — ASPIRIN 325 MG: 325 TABLET ORAL at 09:05

## 2023-11-16 RX ADMIN — SODIUM CHLORIDE, PRESERVATIVE FREE 10 ML: 5 INJECTION INTRAVENOUS at 09:05

## 2023-11-16 RX ADMIN — HEPARIN SODIUM 5000 UNITS: 5000 INJECTION INTRAVENOUS; SUBCUTANEOUS at 20:56

## 2023-11-16 RX ADMIN — PIPERACILLIN AND TAZOBACTAM 3375 MG: 3; .375 INJECTION, POWDER, LYOPHILIZED, FOR SOLUTION INTRAVENOUS at 21:02

## 2023-11-16 RX ADMIN — MORPHINE SULFATE 2 MG: 2 INJECTION, SOLUTION INTRAMUSCULAR; INTRAVENOUS at 12:00

## 2023-11-16 RX ADMIN — HEPARIN SODIUM 5000 UNITS: 5000 INJECTION INTRAVENOUS; SUBCUTANEOUS at 05:24

## 2023-11-16 RX ADMIN — PIPERACILLIN AND TAZOBACTAM 3375 MG: 3; .375 INJECTION, POWDER, LYOPHILIZED, FOR SOLUTION INTRAVENOUS at 05:24

## 2023-11-16 RX ADMIN — INSULIN LISPRO 4 UNITS: 100 INJECTION, SOLUTION INTRAVENOUS; SUBCUTANEOUS at 09:04

## 2023-11-16 RX ADMIN — OXYCODONE HYDROCHLORIDE AND ACETAMINOPHEN 1 TABLET: 5; 325 TABLET ORAL at 20:59

## 2023-11-16 RX ADMIN — INSULIN LISPRO 10 UNITS: 100 INJECTION, SOLUTION INTRAVENOUS; SUBCUTANEOUS at 16:42

## 2023-11-16 RX ADMIN — POLYETHYLENE GLYCOL (3350) 17 G: 17 POWDER, FOR SOLUTION ORAL at 09:05

## 2023-11-16 RX ADMIN — SODIUM CHLORIDE, PRESERVATIVE FREE 10 ML: 5 INJECTION INTRAVENOUS at 20:59

## 2023-11-16 RX ADMIN — ATORVASTATIN CALCIUM 40 MG: 40 TABLET, FILM COATED ORAL at 20:57

## 2023-11-16 RX ADMIN — INSULIN GLARGINE 40 UNITS: 100 INJECTION, SOLUTION SUBCUTANEOUS at 20:58

## 2023-11-16 RX ADMIN — INSULIN LISPRO 6 UNITS: 100 INJECTION, SOLUTION INTRAVENOUS; SUBCUTANEOUS at 11:56

## 2023-11-16 RX ADMIN — CLOPIDOGREL BISULFATE 75 MG: 75 TABLET ORAL at 09:05

## 2023-11-16 RX ADMIN — OXYCODONE HYDROCHLORIDE AND ACETAMINOPHEN 1 TABLET: 5; 325 TABLET ORAL at 01:22

## 2023-11-16 RX ADMIN — HEPARIN SODIUM 5000 UNITS: 5000 INJECTION INTRAVENOUS; SUBCUTANEOUS at 14:55

## 2023-11-16 RX ADMIN — OXYCODONE HYDROCHLORIDE AND ACETAMINOPHEN 1 TABLET: 5; 325 TABLET ORAL at 09:05

## 2023-11-16 RX ADMIN — Medication 1 AMPULE: at 09:15

## 2023-11-16 RX ADMIN — PIPERACILLIN AND TAZOBACTAM 3375 MG: 3; .375 INJECTION, POWDER, LYOPHILIZED, FOR SOLUTION INTRAVENOUS at 12:02

## 2023-11-16 RX ADMIN — INSULIN LISPRO 4 UNITS: 100 INJECTION, SOLUTION INTRAVENOUS; SUBCUTANEOUS at 16:41

## 2023-11-16 RX ADMIN — INSULIN LISPRO 10 UNITS: 100 INJECTION, SOLUTION INTRAVENOUS; SUBCUTANEOUS at 09:13

## 2023-11-16 RX ADMIN — Medication 1 AMPULE: at 21:05

## 2023-11-16 ASSESSMENT — PAIN SCALES - GENERAL
PAINLEVEL_OUTOF10: 6
PAINLEVEL_OUTOF10: 6
PAINLEVEL_OUTOF10: 8
PAINLEVEL_OUTOF10: 7

## 2023-11-16 ASSESSMENT — PAIN DESCRIPTION - LOCATION
LOCATION: OTHER (COMMENT)
LOCATION: RECTUM
LOCATION: RECTUM
LOCATION: OTHER (COMMENT)

## 2023-11-16 ASSESSMENT — PAIN DESCRIPTION - ORIENTATION
ORIENTATION: POSTERIOR
ORIENTATION: POSTERIOR

## 2023-11-16 ASSESSMENT — PAIN DESCRIPTION - DESCRIPTORS
DESCRIPTORS: ACHING;BURNING
DESCRIPTORS: ACHING;BURNING

## 2023-11-16 NOTE — PROGRESS NOTES
End of Shift Note    Bedside shift change report given to Dirk Nielson (oncoming nurse) by Holley Quinonez RN (offgoing nurse). Report included the following information Nurse Handoff Report      Shift worked:  Days   Shift summary and any significant changes:     Patient admitted to floor  Baseline NIHSS obtained   Telemetry in place        Concerns for physician to address:     Zone phone for oncoming shift:        Patient Information  Lisa Kim  46 y.o.  11/14/2023  6:59 AM by Apple Singh MD. Lisa Kim was admitted from Home    Problem List  Patient Active Problem List    Diagnosis Date Noted    Rectal mass 11/14/2023    Hyperglycemia 11/14/2023    Type 1 diabetes mellitus with complication, with long term current use of insulin pump ([address]) 11/14/2023    Hemoglobin A1C greater than 9%, indicating poor diabetic control 11/14/2023     Past Medical History:   Diagnosis Date    Diabetes ([address])     IDDM       Core Measures:  CVA: Yes Yes  CHF:No No  PNA:NoNo    Activity:     Number times ambulated in hallways past shift: 0  Number of times OOB to chair past shift: 0    Cardiac:   Cardiac Monitoring: Yes      Cardiac Rhythm: Sinus rhythm    Access:   Current line(s): PIV  Central Line? No    PICC LINE? No     Genitourinary:   Urinary status: voiding   Urinary Catheter? No     Respiratory:   O2 Device: None (Room air)  Chronic home O2 use?: no  Incentive spirometer at bedside: no       GI:     Current diet:  ADULT DIET; Regular; 3 carb choices (45 gm/meal)  Passing flatus: yes  Tolerating current diet: yes       Pain Management:   Patient states pain is manageable on current regimen: yes    Skin:  Meño Scale Score: 21  Interventions: increase time out of bed and PT/OT consult    Patient Safety:  Fall Score:  Dominguez Total Score: 0  Interventions: bed/chair alarm, assistive devices (walker, cane, etc.), gripper socks, pt to call before getting OOB, and stay with me (per policy)     @Rollbelt  @dexterity to release

## 2023-11-16 NOTE — DIABETES MGMT
[address] [facility]  DIABETES MANAGEMENT CONSULT    Consulted by Provider for advanced nursing evaluation and care for inpatient blood glucose management. Evaluation and Action Plan   This 46year old male patient with Type 1 diabetes did not achieve BG control prior to admission. The patient reports being dx with diabetes approx 5 years ago. He uses an omnipod insulin pump. The patient Omnipod was about to run out and instead of resuming the insulin pump today, he will wait and we will use subQ insulin injections instead. I will start a basal dose tonight that is comparable to his total daily dose on his insulin pump. The patient is being taken down for surgery  ( renal mass). I will see this patient again in the morning. The patient reports doing well this morning. He is eating and tolerating. He received a basal insulin injection last evening, however BG's have been elevated today. I suspect pre-op steroid dose is the primary culprit. I have ordered schedule mealtime insulin since the patient is eating again. I will keep the basal dosing the same for now I suspect the effects of the steroid should dissipate soon. BG's have been elevated on the current dosing. I have increased the basal insulin dosing for tonight and also made adjustments to the scheduled meal time insulin. The patient had a CVA yesterday afternoon and I suspect this may be the culprit in BG elevation as well. Management Rationale Action Plan   Medication   Basal needs Using home dose on insulin pump Stop insulin pump use and use 40 units Lantus nightly for now.     Corrective insulin Using medium dose  sensitivity based on weight Start 10 units Humalog with each meal    Additional orders          Diabetes Discharge Plan   Medication  TBD   Referral  []        Outpatient diabetes education   Additional orders            Initial Presentation   Zoraida Escobar is a 46 y.o. male admitted  after experiencing illness,

## 2023-11-16 NOTE — PROGRESS NOTES
End of Shift Note    Bedside shift change report given to Kulwant ShanksSuite A (oncoming nurse) by Dolores Zavala RN (offgoing nurse). Report included the following information Nurse Handoff Report, Intake/Output, MAR, Recent Results, and Cardiac Rhythm sinus tach      Shift worked:  7a-7p   Shift summary and any significant changes:    Pt educated to turn Q2H as pt able to turn self. Pt up to the bathroom X6. Sitz bath given. Concerns for physician to address: none   Zone phone for oncoming shift:  [phone]      Patient Information  Benson Mallory  46 y.o.  11/14/2023  6:59 AM by Riley Zabala MD. Benson Mallory was admitted from Home    Problem List  Patient Active Problem List    Diagnosis Date Noted    Rectal mass 11/14/2023    Hyperglycemia 11/14/2023    Type 1 diabetes mellitus with complication, with long term current use of insulin pump (HCC) 11/14/2023    Hemoglobin A1C greater than 9%, indicating poor diabetic control 11/14/2023     Past Medical History:   Diagnosis Date    Diabetes ([address])     IDDM       Core Measures:  CVA: Yes Yes  CHF:No No  PNA:NoNo    Activity:     Number times ambulated in hallways past shift: 0  Number of times OOB to chair past shift: 1    Cardiac:   Cardiac Monitoring: Yes      Cardiac Rhythm: Sinus rhythm    Access:   Current line(s): PIV    Genitourinary:   Urinary status: voiding     Respiratory:   O2 Device: None (Room air)  Chronic home O2 use?: no  Incentive spirometer at bedside: no       GI:  Last BM (including prior to admit): 11/13/23  Current diet:  ADULT DIET; Regular; 3 carb choices (45 gm/meal)  Passing flatus: yes  Tolerating current diet: yes       Pain Management:   Patient states pain is manageable on current regimen: yes    Skin:  Meño Scale Score: 19  Interventions: increase time out of bed    Patient Safety:  Fall Score:  Dominguez Total Score: 35  Interventions: bed/chair alarm, assistive devices (walker, cane, etc.), gripper socks, pt to call before getting OOB, and stay with me (per policy)     @Rollbelt  @dexterity to release roll belt  Yes/No ( must document dexterity  here by stating Yes or No here, otherwise this is a restraint and must follow restraint documentation policy.)    DVT prophylaxis:  DVT prophylaxis Med- yes  DVT prophylaxis SCD or LORRAINE- no     Wounds: (If Applicable)  Wounds- yes  Location rectum    Active Consults:  IP CONSULT TO HOSPITALIST  IP CONSULT TO COLORECTAL SURGERY  IP CONSULT TO DIABETES MANAGEMENT  IP CONSULT TO DIETITIAN  IP CONSULT TO NEUROLOGY    Length of Stay:  Expected LOS: 3  Actual LOS: 2  Discharge Plan: N/A IPR placement      Jenny Oh RN

## 2023-11-16 NOTE — PLAN OF CARE
Problem: Occupational Therapy - Adult  Goal: By Discharge: Performs self-care activities at highest level of function for planned discharge setting. See evaluation for individualized goals. Description: FUNCTIONAL STATUS PRIOR TO ADMISSION:  Patient was independent in ADLs and functional mobility. He lives in [facility] but was in [place] for work.    ,  ,  ,  ,  ,  ,  ,  ,  ,  , Active : Yes     HOME SUPPORT: Patient lived alone with no local support. His daughter and mom live in [place] and he would like to return there at discharge if possible. Occupational Therapy Goals:  Initiated 11/16/2023  1. Patient will perform grooming standing at sink with Supervision within 7 day(s). 2.  Patient will perform upper body dressing with Supervision within 7 day(s). 3.  Patient will perform lower body dressing using AE PRN with Minimal Assist within 7 day(s). 4.  Patient will perform toilet transfers with Supervision  within 7 day(s). 5.  Patient will perform all aspects of toileting with Minimal Assist within 7 day(s). 6.  Patient will participate in upper extremity therapeutic exercise/activities with Supervision for 3 minutes within 7 day(s). Outcome: Progressing   OCCUPATIONAL THERAPY EVALUATION    Patient: Gallo Squires (52 y.o. male)  Date: 11/16/2023  Primary Diagnosis: Hyperglycemia [R73.9]  Rectal mass [K62.89]  Procedure(s) (LRB):  RECTAL EXAM UNDER ANESTHESIA, BIOPSY OF RECTAL MASS AND WASHOUT (N/A) 2 Days Post-Op     Precautions: Fall Risk, Bed Alarm                  ASSESSMENT :  The patient is limited by decreased functional mobility, independence in ADLs, high-level IADLs, ROM, strength, sensation, body mechanics, activity tolerance, endurance, safety awareness, coordination, balance, posture, fine-motor control. Based on the impairments listed above patient is functioning below her baseline for ADLs and functional mobility.  He is now completing ADLs with independence to max assist and functional <1s  Coordination/Speed Total: 6    Total A-D  Total A-D (Motor Function): 66         This is a reliable/valid measure of arm function after a neurological event. It has established value to characterize functional status and for measuring spontaneous and therapy-induced recovery; tests proximal and distal motor functions. Fugl-Mendez Assessment - UE scores recorded between five and 30 days post neurologic event can be used to predict UE recovery at six months post neurologic event. Severe = 0-21 points   Moderately Severe = 22-33 points   Moderate = 34-47 points   Mild = 48-66 points  MARIBEL Eaton, CHRIS Herrera, & JAIME Guzman (1992). Measurement of motor recovery after stroke: Outcome assessment and sample size requirements.  Stroke, 23, pp. 9622-2582.   --------------------------------------------------------------------------------------------------------------------------------------------------------------------  MCID:  Stroke:   Juan Manuel Coffey et al, 2001; n = 171; mean age 79 (5) years; assessed within 16 (12) days of stroke, Acute Stroke)  FMA Motor Scores from Admission to Discharge   10 point increase in FMA Upper Extremity = 1.5 change in discharge FIM   10 point increase in FMA Lower Extremity = 1.9 change in discharge FIM  MDC:   Stroke:   Betina Trujillo et al, 2008, n = 14, mean age = 59.9 (14.6) years, assessed on average 14 (6.5) months post stroke, Chronic Stroke)   FMA = 5.2 points for the Upper Extremity portion of the assessment                                                                                                                                                                                                                                 Pain Rating:  Patient c/o rectal pain from procedure   Pain Intervention(s):   repositioning    Activity Tolerance:   Fair  and requires rest breaks    After treatment:   Patient left in no apparent distress sitting up in chair, Call bell within reach, and Bed/ chair alarm activated    COMMUNICATION/EDUCATION:   The patient's plan of care was discussed with: physical therapist and registered nurse    Patient Education  Education Given To: Patient  Education Provided: Role of Therapy;Plan of Care  Education Method: Verbal  Barriers to Learning: None  Education Outcome: Verbalized understanding;Continued education needed    Thank you for this referral.  Jamil Kelley OT  Minutes: 34    Occupational Therapy Evaluation Charge Determination   History Examination Decision-Making   MEDIUM Complexity : Expanded review of history including physical, cognitive and psychocial history  MEDIUM Complexity: 3-5 Performance deficits relating to physical, cognitive, or psychosocial skills that result in activity limitations and/or participation restrictions MEDIUM Complexity: Patient may present with comorbidities that affect occupational performance.  Minimal to moderate modifications of tasks or assist (eg. physical or verbal) with assist is necessary to enable pt to complete eval   Based on the above components, the patient evaluation is determined to be of the following complexity level: Medium

## 2023-11-16 NOTE — PROGRESS NOTES
[address] [facility], [facility] Progress Note - Neurology    Date of progress Note: November 16, 2023    Marbella Mcnamara  [id number]  1970  [address] [address]    Primary Care Physician:  Patrica Brandon MD  [unfilled]  [phone]    Referring Physician:  [unfilled]    Impression: Acute left cerebral infarction yesterday morning occurred around 10 AM.  Diagnosis of stroke was not made until almost 4:00 when the MRI report was available and he was outside the window for thrombolytic. He has had improvement in his right upper extremity and right lower extremity overnight. He is on dual antiplatelet therapy and a statin. Plan: Lipid panel is pending. Echocardiogram result is pending. He will continue on dual antiplatelet therapy and a statin. He will benefit from ongoing physical therapy inpatient and outpatient. Barrier to Discharge from Neurologic Perspective: Echocardiogram result. He might be ready for discharge tomorrow as long as he has adequate support here locally. He is considering having his son come down to pick him up. Outpatient Neurology Follow Up: He plans to go up to [place] where he will continue his outpatient therapy. Ankur Arango M.D. Diplomate, American Board of Neurology and Psychiatry  Diplomate, American Board of Electrodiagnostic Medicine  Subspecialty Certification, Headache Medicine, [facility]  ____________________________________________________________________    Reason for Visit: Marbella Mcnamara is a 46 y.o. y/o male who is seen in the hospital for follow up of stroke. Interval History: This is a gentleman who had some right leg weakness and numbness transiently back on Monday before he came into the hospital.  Unfortunately this recurred and worsened yesterday around 10 in the morning. I saw him around .   He has not developed any new neurologic

## 2023-11-16 NOTE — PLAN OF CARE
Problem: Pain  Goal: Verbalizes/displays adequate comfort level or baseline comfort level  11/16/2023 0940 by Carley Haddad RN  Outcome: Progressing  11/15/2023 2352 by Lizz Santos LPN  Outcome: Progressing     Problem: Chronic Conditions and Co-morbidities  Goal: Patient's chronic conditions and co-morbidity symptoms are monitored and maintained or improved  11/16/2023 0940 by Carley Haddad RN  Outcome: Progressing  11/15/2023 2352 by Lizz Santos LPN  Outcome: Progressing     Problem: Discharge Planning  Goal: Discharge to home or other facility with appropriate resources  11/16/2023 0940 by Carley Haddad RN  Outcome: Progressing  11/15/2023 2352 by Lizz Santos LPN  Outcome: Progressing     Problem: ABCDS Injury Assessment  Goal: Absence of physical injury  11/16/2023 0940 by Carley Haddad RN  Outcome: Progressing  11/15/2023 2352 by Lizz Santos LPN  Outcome: Progressing     Problem: Safety - Adult  Goal: Free from fall injury  11/16/2023 0940 by Carley Haddad RN  Outcome: Progressing  11/15/2023 2352 by Lizz Santos LPN  Outcome: Progressing     Problem: Neurosensory - Adult  Goal: Achieves stable or improved neurological status  11/16/2023 0940 by Carley Haddad RN  Outcome: Progressing  11/15/2023 2352 by Lizz Santos LPN  Outcome: Progressing  Goal: Absence of seizures  11/16/2023 0940 by Carley Haddad RN  Outcome: Progressing  11/15/2023 2352 by Lizz Santos LPN  Outcome: Progressing  Goal: Remains free of injury related to seizures activity  11/16/2023 0940 by Carley Haddad RN  Outcome: Progressing  11/15/2023 2352 by Lizz Santos LPN  Outcome: Progressing  Goal: Achieves maximal functionality and self care  11/16/2023 0940 by Carley Haddad RN  Outcome: Progressing  11/15/2023 2352 by Lizz Santos LPN  Outcome: Progressing     Problem: Respiratory - Adult  Goal: Achieves optimal ventilation and oxygenation  11/16/2023 0940 by Carley Haddad RN  Outcome:

## 2023-11-16 NOTE — PROGRESS NOTES
End of Shift Note    Bedside shift change report given to Rsoie Bowles (oncoming nurse) by Iain Jensen LPN (offgoing nurse). Report included the following information Nurse Handoff Report      Shift worked:  Nights   Shift summary and any significant changes:    PRN Hydralazine,Melatonin, and Oxycodone was giving  Labs done     Concerns for physician to address:  See notes above ( B/P)   Zone phone for oncoming shift:   [phone]     Patient Information  Zoraida Escobar  46 y.o.  11/14/2023  6:59 AM by Silvina Lou MD. Zoraida Escobar was admitted from Home    Problem List  Patient Active Problem List    Diagnosis Date Noted    Rectal mass 11/14/2023    Hyperglycemia 11/14/2023    Type 1 diabetes mellitus with complication, with long term current use of insulin pump ([address]) 11/14/2023    Hemoglobin A1C greater than 9%, indicating poor diabetic control 11/14/2023     Past Medical History:   Diagnosis Date    Diabetes ([address])     IDDM       Core Measures:  CVA: Yes Yes  CHF:No No  PNA:NoNo    Activity:     Number times ambulated in hallways past shift: 0  Number of times OOB to chair past shift: 0    Cardiac:   Cardiac Monitoring: Yes      Cardiac Rhythm: Sinus rhythm    Access:   Current line(s): PIV  Central Line? No    PICC LINE? No     Genitourinary:   Urinary status: voiding   Urinary Catheter? No     Respiratory:   O2 Device: None (Room air)  Chronic home O2 use?: no  Incentive spirometer at bedside: no       GI:  Last BM (including prior to admit): 11/13/23  Current diet:  ADULT DIET; Regular; 3 carb choices (45 gm/meal)  Passing flatus: yes  Tolerating current diet: yes       Pain Management:   Patient states pain is manageable on current regimen: yes    Skin:  Meño Scale Score: 18  Interventions: increase time out of bed and PT/OT consult    Patient Safety:  Fall Score:  Dominguez Total Score: 35  Interventions: bed/chair alarm, assistive devices (walker, cane, etc.), gripper socks, pt to call before getting OOB, and stay with

## 2023-11-16 NOTE — CARE COORDINATION
Transition of Care Plan:    RUR: 13 % (low RUR)  Prior Level of Functioning: Independent  Disposition: IPR vs Home  If SNF or IPR: Date FOC offered: 11/16/2023  Date FOC received: 11/16/2023  Accepting facility: Referrals pending  Date authorization started with reference number:   Date authorization received and expires: Follow up appointments: defer to IPR vs to be scheduled  DME needed: defer to IPR  Transportation at discharge: Family vs BLS  IM/IMM Medicare/ letter given: n/a - commercial  Is patient a  and connected with VA? N/a   If yes, was [employer] transfer form completed and VA notified? N/a  Caregiver Contact:   Discharge Caregiver contacted prior to discharge? Pt to contact  Care Conference needed? Not at this time  Barriers to discharge: Medical clearance, placement, auth    1554 - CM noted PT rec for IPR CM discussed rec with pt and provided [facility] list. Pt states their goal is to be home in [place] for Thanksgiving next Thursday. Pt recognizes need for IPR and is agreeable to seeking placement, but is concerned about making it home for the holiday. Pt states his mother was planning to drive down and transport him back to [place]. CM explained need for IPR and gaining previous level of independence. Pt is agreeable to CM sending IPR referrals to [facility], [facility], and [facility]. Referrals placed. CM to continue to follow.      CHRISSY Brewster  Care Management

## 2023-11-16 NOTE — PLAN OF CARE
H&P Exam - General Surgery   Albania Adams 49 y o  female MRN: [id number]  Unit/Bed#: ED 06 Encounter: [id number]    Assessment/Plan     Assessment:  The patient is a 54-year-old white female being worked up for occult GI bleeding with a capsule endoscopy  She has previously had an EGD, and a colonoscopy at the [facility] endoscopy center, results are not available to me  She reports that findings were consistent with Crohn's disease  She was also noted to have erosive esophagitis  The patient relates that she had severe epigastric pain throughout the day after beginning the capsule endoscopy  She was seen by her gastroenterologist who referred her to the emergency room, as the amount of pain she was experiencing appeared to be out of proportion to what would be expected  There were also concerned regarding the position of the capsule  Subsequent CT scan in the emergency room reports that the appendix appears indurated, and that the video capsule is in the descending colon, however, it appears to me that the capsule is actually in the ascending colon  None the less, surgery is consulted regarding the possibility of acute appendicitis  At present the patient is tender in the epigastrium, with minimal tenderness to deep palpation in the right lower quadrant  She does have an elevated white count, but no fever or tachycardia  Plan:  Admit for observation  IV hydration, p o  Carafate and IV Protonix  Repeat KUB in the a m  History of Present Illness     HPI:  Albania Adams is a 49 y o  female who presents with epigastric pain following capsule endoscopy  Subsequent CT scan in the emergency room reveals the video capsule to be within the colon, but suggests that she has a indurated inflamed appendix  Clinically the suspicion for acute appendicitis is quite low  The patient has had both an EGD and a colonoscopy at the [facility] endoscopy center, and is believed to have Crohn's disease    She is Problem: Physical Therapy - Adult  Goal: By Discharge: Performs mobility at highest level of function for planned discharge setting. See evaluation for individualized goals. Description: FUNCTIONAL STATUS PRIOR TO ADMISSION: Patient was independent and active without use of DME. Lives in [place] but was traveling to [address] [address] for work when symptoms occurred. HOME SUPPORT PRIOR TO ADMISSION: The patient lived alone with family in the area to provide assistance. (Reports he is currently getting )    Physical Therapy Goals  Initiated 11/16/2023  1. Patient will move from supine to sit and sit to supine in bed with modified independence within 7 day(s). 2.  Patient will perform sit to stand with supervision/set-up within 7 day(s). 3.  Patient will transfer from bed to chair and chair to bed with contact guard assist using the least restrictive device within 7 day(s). 4.  Patient will ambulate with contact guard assist for 150 feet with the least restrictive device within 7 day(s). Outcome: Progressing   PHYSICAL THERAPY EVALUATION    Patient: Dequan Núñez (52 y.o. male)  Date: 11/16/2023  Primary Diagnosis: Hyperglycemia [R73.9]  Rectal mass [K62.89]  Procedure(s) (LRB):  RECTAL EXAM UNDER ANESTHESIA, BIOPSY OF RECTAL MASS AND WASHOUT (N/A) 2 Days Post-Op   Precautions: Fall Risk, Bed Alarm                    ASSESSMENT :   DEFICITS/IMPAIRMENTS:   The patient is limited by impaired balance, gait instability, profound weakness of R LE, R foot drop and decreased activity tolerance. Overall needing Noel for bed mobility mainly to manage R LE. Good sitting balance and strength is intact R UE.  R LE with trace hip flexion, 2/5 quad, 0/5 DF/PF, and 0/5 hamstring. Able to come to stand with Noel and amb approx 20 feet x 2 with Rw and Noel. Does have instability on the R as well as foot drop but is able to compensate with UE support of RW.   Patient is well below his functional baseline and will apparently being worked up for occult bleeding from a small bowel source, thus the capsule endoscopy  Review of Systems   Constitutional: Negative for chills, fever and unexpected weight change  HENT: Positive for trouble swallowing (Consistent with esophagitis)  Respiratory: Negative for cough and shortness of breath  Cardiovascular: Negative for chest pain and palpitations  Gastrointestinal: Positive for abdominal distention, abdominal pain and diarrhea  Negative for anal bleeding, nausea and vomiting  Blood in stool:  as per the patient, occult blood  Genitourinary: Negative for difficulty urinating  Skin: Negative for pallor  Neurological: Negative for weakness  Psychiatric/Behavioral: Negative for agitation and confusion         Historical Information   Past Medical History:   Diagnosis Date    Bulging of cervical intervertebral disc     Crohn disease ([address] )     Disease of thyroid gland     Migraines     Neck pain, chronic     Polycystic ovarian syndrome     PONV (postoperative nausea and vomiting)     TMJ (temporomandibular joint syndrome)      Past Surgical History:   Procedure Laterality Date    ADENOIDECTOMY      ANKLE SURGERY Left     reconstruction     SECTION      x2    DIAGNOSTIC LAPAROSCOPY      HYSTERECTOMY      KS ARTHRODESIS ANT INTERBODY INC DISCECTOMY, CERVICAL BELOW C2 Bilateral 2019    Procedure: C5/6 ANTERIOR CERVICAL DECOMPRESSION AND FUSION;  Surgeon: Patti Xie MD;  [facility];  Service: Neurosurgery    TONSILLECTOMY      TUBAL LIGATION       Social History   Social History     Substance and Sexual Activity   Alcohol Use No     Social History     Substance and Sexual Activity   Drug Use Never     Social History     Tobacco Use   Smoking Status Current Every Day Smoker    Packs/day: 1 00    Types: Cigarettes   Smokeless Tobacco Never Used     E-Cigarette/Vaping    E-Cigarette Use Never User      E-Cigarette/Vaping Substances Family History: History reviewed  No pertinent family history  Meds/Allergies   PTA meds:   Prior to Admission Medications   Prescriptions Last Dose Informant Patient Reported? Taking?    Cyanocobalamin (VITAMIN B 12 PO)  Self Yes No   Sig: Take by mouth   amLODIPine (NORVASC) 5 mg tablet   Yes No   Sig: Take 5 mg by mouth as needed    cholecalciferol (VITAMIN D3) 1,000 units tablet   Yes No   Sig: Take 2,000 Units by mouth daily   ferrous sulfate (IRON SUPPLEMENT) 325 (65 Fe) mg tablet   Yes No   Sig: Take 325 mg by mouth daily with breakfast   fluticasone (FLONASE) 50 mcg/act nasal spray   No No   Si spray into each nostril daily   Patient not taking: Reported on 2020   furosemide (LASIX) 40 mg tablet   No No   Sig: Take 1 tablet (40 mg total) by mouth daily   gabapentin (NEURONTIN) 300 mg capsule   No No   Sig: Take 1 capsule (300 mg total) by mouth 3 (three) times a day   levothyroxine 100 mcg tablet   Yes No   Sig: Take 100 mcg by mouth daily   omeprazole (PriLOSEC) 20 mg delayed release capsule   Yes No   Sig: Take 1 capsule by mouth daily    oseltamivir (TAMIFLU) 75 mg capsule   Yes No   Sig: Take 75 mg by mouth daily   potassium chloride (K-DUR,KLOR-CON) 10 mEq tablet   No No   Sig: Take 1 tablet (10 mEq total) by mouth 2 (two) times a day   pramipexole (MIRAPEX) 0 5 mg tablet   Yes No   Sig: Take 0 5 mg by mouth 2 (two) times a day     topiramate (TOPAMAX) 25 mg tablet   Yes No   Sig: Take 25 mg by mouth daily at bedtime    topiramate (TOPAMAX) 50 MG tablet   Yes No   traMADol (ULTRAM) 50 mg tablet   Yes No   Sig: Take 50 mg by mouth 2 (two) times a day as needed for moderate pain   urea (CARMOL) 40 % ointment   Yes No   Sig: Apply topically      Facility-Administered Medications: None     Allergies   Allergen Reactions    Amoxicillin Rash    Codeine Rash and Other (See Comments)     Rash    Diclofenac Rash    Penicillins Other (See Comments)     Rash    Sulfa Antibiotics Other (See Comments)     Rash    Diclofenac Sodium Rash    Etodolac Rash and Other (See Comments)     Rash         Objective   First Vitals:   Blood Pressure: 140/80 (03/10/20 1745)  Pulse: 85 (03/10/20 1745)  Temperature: 97 9 °F (36 6 °C) (03/10/20 1745)  Temp Source: Oral (03/10/20 1745)  Respirations: 20 (03/10/20 1745)  Height: 5' 10" (177 8 cm) (03/10/20 1745)  Weight - Scale: 127 kg (280 lb) (03/10/20 1745)  SpO2: 98 % (03/10/20 1745)    Current Vitals:   Blood Pressure: 122/60 (03/10/20 2045)  Pulse: 61 (03/10/20 2045)  Temperature: 97 9 °F (36 6 °C) (03/10/20 1745)  Temp Source: Oral (03/10/20 1745)  Respirations: 18 (03/10/20 2045)  Height: 5' 10" (177 8 cm) (03/10/20 1745)  Weight - Scale: 127 kg (280 lb) (03/10/20 1745)  SpO2: 98 % (03/10/20 2045)    No intake or output data in the 24 hours ending 03/10/20 2141    Invasive Devices     Peripheral Intravenous Line            Peripheral IV 03/10/20 Right Antecubital less than 1 day                Physical Exam   Constitutional: She is oriented to person, place, and time  Obese white female appears uncomfortable, but in no acute distress   HENT:   Head: Normocephalic and atraumatic  Eyes: Pupils are equal, round, and reactive to light  No scleral icterus  Neck: Neck supple  Cardiovascular: Normal rate and regular rhythm  Pulmonary/Chest: Effort normal and breath sounds normal    Abdominal: She exhibits no mass  There is tenderness (Maximal in the epigastrium, mild tenderness across the suprapubic distribution with deep palpation  Bowel sounds are audible, but somewhat subdued)  There is no rebound and no guarding  Genitourinary:   Genitourinary Comments: Deferred   Musculoskeletal: She exhibits no edema  Neurological: She is alert and oriented to person, place, and time  Skin: Skin is dry  Psychiatric: She has a normal mood and affect  Lab Results:   I have personally reviewed pertinent lab results    , CBC:   Lab Results   Component Value Date    WBC 13 50 (H) 03/10/2020    HGB 13 6 03/10/2020    HCT 41 8 (L) 03/10/2020    MCV 89 03/10/2020     03/10/2020    MCH 29 0 03/10/2020    MCHC 32 5 03/10/2020    RDW 13 5 03/10/2020    MPV 6 9 (L) 03/10/2020   , CMP:   Lab Results   Component Value Date    SODIUM 139 03/10/2020    K 4 0 03/10/2020     (H) 03/10/2020    CO2 21 03/10/2020    BUN 10 03/10/2020    CREATININE 0 92 03/10/2020    CALCIUM 9 3 03/10/2020    AST 8 (L) 03/10/2020    ALT 9 03/10/2020    ALKPHOS 45 03/10/2020    EGFR 74 03/10/2020   , Coagulation: No results found for: PT, INR, APTT, Urinalysis: No results found for: COLORU, CLARITYU, SPECGRAV, PHUR, LEUKOCYTESUR, NITRITE, PROTEINUA, GLUCOSEU, KETONESU, BILIRUBINUR, BLOODU, Amylase: No results found for: AMYLASE, Lipase:   Lab Results   Component Value Date    LIPASE 14 03/10/2020     Imaging: I have personally reviewed pertinent reports  and I have personally reviewed pertinent films in PACS  EKG, Pathology, and Other Studies: I have personally reviewed pertinent reports  Code Status: Level 1 - Full Code  Advance Directive and Living Will:      Power of :    POLST:      Counseling / Coordination of Care  Total floor / unit time spent today 20 minutes  Greater than 50% of total time was spent with the patient and / or family counseling and / or coordination of care  A description of the counseling / coordination of care: Including discussion with the emergency room physician

## 2023-11-16 NOTE — PLAN OF CARE
Problem: Pain  Goal: Verbalizes/displays adequate comfort level or baseline comfort level  11/15/2023 2352 by Iain Jensen LPN  Outcome: Progressing  11/15/2023 1900 by Lashaun Chavez RN  Outcome: Progressing  11/15/2023 1138 by Inga Perdomo RN  Outcome: Progressing     Problem: Chronic Conditions and Co-morbidities  Goal: Patient's chronic conditions and co-morbidity symptoms are monitored and maintained or improved  11/15/2023 2352 by Iain Jensen LPN  Outcome: Progressing  11/15/2023 1900 by Lashaun Chavez RN  Outcome: Progressing  11/15/2023 1138 by Inga Perdomo RN  Outcome: Progressing     Problem: Discharge Planning  Goal: Discharge to home or other facility with appropriate resources  11/15/2023 2352 by Iain Jensen LPN  Outcome: Progressing  11/15/2023 1900 by Lashaun Chavez RN  Outcome: Progressing  11/15/2023 1138 by Inga Perdomo RN  Outcome: Progressing     Problem: ABCDS Injury Assessment  Goal: Absence of physical injury  11/15/2023 2352 by Iain Jensen LPN  Outcome: Progressing  11/15/2023 1900 by Lashaun Chavez RN  Outcome: Progressing  11/15/2023 1138 by Inga Perdomo RN  Outcome: Progressing     Problem: Safety - Adult  Goal: Free from fall injury  11/15/2023 2352 by Iain Jensen LPN  Outcome: Progressing  11/15/2023 1900 by Lashaun Chavez RN  Outcome: Progressing     Problem: Neurosensory - Adult  Goal: Achieves stable or improved neurological status  11/15/2023 2352 by Iain Jensen LPN  Outcome: Progressing  11/15/2023 1900 by Lashaun Chavez RN  Outcome: Progressing  Goal: Absence of seizures  11/15/2023 2352 by Iain Jensen LPN  Outcome: Progressing  11/15/2023 1900 by Lashaun Chavez RN  Outcome: Progressing  Goal: Remains free of injury related to seizures activity  11/15/2023 2352 by Iain Jensen LPN  Outcome: Progressing  11/15/2023 1900 by Lashaun Chavez RN  Outcome: Progressing  Goal: Achieves maximal functionality and self care  11/15/2023 2352 by Lizz Santos LPN  Outcome: Progressing  11/15/2023 1900 by Martha Diaz RN  Outcome: Progressing     Problem: Respiratory - Adult  Goal: Achieves optimal ventilation and oxygenation  11/15/2023 2352 by Lizz Santos LPN  Outcome: Progressing  11/15/2023 1900 by Martha Diaz RN  Outcome: Progressing     Problem: Cardiovascular - Adult  Goal: Maintains optimal cardiac output and hemodynamic stability  11/15/2023 2352 by Lizz Santos LPN  Outcome: Progressing  11/15/2023 1900 by Martha Diaz RN  Outcome: Progressing  Goal: Absence of cardiac dysrhythmias or at baseline  11/15/2023 2352 by Lizz Santos LPN  Outcome: Progressing  11/15/2023 1900 by Marhta Diaz RN  Outcome: Progressing     Problem: Skin/Tissue Integrity - Adult  Goal: Skin integrity remains intact  11/15/2023 2352 by Lizz Santos LPN  Outcome: Progressing  11/15/2023 1900 by Martha Diaz RN  Outcome: Progressing  Goal: Incisions, wounds, or drain sites healing without S/S of infection  11/15/2023 2352 by Lizz Santos LPN  Outcome: Progressing  11/15/2023 1900 by Martha Diaz RN  Outcome: Progressing  Goal: Oral mucous membranes remain intact  11/15/2023 2352 by Lizz Santos LPN  Outcome: Progressing  11/15/2023 1900 by Martha Diaz RN  Outcome: Progressing     Problem: Musculoskeletal - Adult  Goal: Return mobility to safest level of function  11/15/2023 2352 by Lizz Santos LPN  Outcome: Progressing  11/15/2023 1900 by Martha Diaz RN  Outcome: Progressing  Goal: Maintain proper alignment of affected body part  11/15/2023 2352 by Lizz Santos LPN  Outcome: Progressing  11/15/2023 1900 by Martha Diaz RN  Outcome: Progressing  Goal: Return ADL status to a safe level of function  11/15/2023 2352 by Lizz Santos LPN  Outcome: Progressing  11/15/2023 1900 by Martha Diaz RN  Outcome: Progressing     Problem: Gastrointestinal - Adult  Goal: Minimal or absence of nausea and vomiting  11/15/2023 2352

## 2023-11-17 LAB
GLUCOSE BLD STRIP.AUTO-MCNC: 219 MG/DL (ref 65–117)
GLUCOSE BLD STRIP.AUTO-MCNC: 255 MG/DL (ref 65–117)
GLUCOSE BLD STRIP.AUTO-MCNC: 278 MG/DL (ref 65–117)
GLUCOSE BLD STRIP.AUTO-MCNC: 289 MG/DL (ref 65–117)
SERVICE CMNT-IMP: ABNORMAL

## 2023-11-17 PROCEDURE — 97110 THERAPEUTIC EXERCISES: CPT

## 2023-11-17 PROCEDURE — 2580000003 HC RX 258

## 2023-11-17 PROCEDURE — 6370000000 HC RX 637 (ALT 250 FOR IP)

## 2023-11-17 PROCEDURE — 6370000000 HC RX 637 (ALT 250 FOR IP): Performed by: PSYCHIATRY & NEUROLOGY

## 2023-11-17 PROCEDURE — 99231 SBSQ HOSP IP/OBS SF/LOW 25: CPT | Performed by: PSYCHIATRY & NEUROLOGY

## 2023-11-17 PROCEDURE — 97530 THERAPEUTIC ACTIVITIES: CPT

## 2023-11-17 PROCEDURE — 6370000000 HC RX 637 (ALT 250 FOR IP): Performed by: SURGERY

## 2023-11-17 PROCEDURE — 1100000003 HC PRIVATE W/ TELEMETRY

## 2023-11-17 PROCEDURE — 97535 SELF CARE MNGMENT TRAINING: CPT

## 2023-11-17 PROCEDURE — 99231 SBSQ HOSP IP/OBS SF/LOW 25: CPT

## 2023-11-17 PROCEDURE — 97116 GAIT TRAINING THERAPY: CPT

## 2023-11-17 PROCEDURE — 6360000002 HC RX W HCPCS

## 2023-11-17 PROCEDURE — 82962 GLUCOSE BLOOD TEST: CPT

## 2023-11-17 PROCEDURE — 6360000002 HC RX W HCPCS: Performed by: STUDENT IN AN ORGANIZED HEALTH CARE EDUCATION/TRAINING PROGRAM

## 2023-11-17 RX ORDER — INSULIN GLARGINE 100 [IU]/ML
45 INJECTION, SOLUTION SUBCUTANEOUS NIGHTLY
Status: DISCONTINUED | OUTPATIENT
Start: 2023-11-17 | End: 2023-11-20

## 2023-11-17 RX ORDER — INSULIN LISPRO 100 [IU]/ML
13 INJECTION, SOLUTION INTRAVENOUS; SUBCUTANEOUS
Status: DISCONTINUED | OUTPATIENT
Start: 2023-11-17 | End: 2023-11-20

## 2023-11-17 RX ADMIN — HYDRALAZINE HYDROCHLORIDE 10 MG: 20 INJECTION INTRAMUSCULAR; INTRAVENOUS at 03:50

## 2023-11-17 RX ADMIN — PIPERACILLIN AND TAZOBACTAM 3375 MG: 3; .375 INJECTION, POWDER, LYOPHILIZED, FOR SOLUTION INTRAVENOUS at 21:53

## 2023-11-17 RX ADMIN — PIPERACILLIN AND TAZOBACTAM 3375 MG: 3; .375 INJECTION, POWDER, LYOPHILIZED, FOR SOLUTION INTRAVENOUS at 14:11

## 2023-11-17 RX ADMIN — INSULIN LISPRO 4 UNITS: 100 INJECTION, SOLUTION INTRAVENOUS; SUBCUTANEOUS at 12:20

## 2023-11-17 RX ADMIN — SODIUM CHLORIDE, PRESERVATIVE FREE 10 ML: 5 INJECTION INTRAVENOUS at 21:57

## 2023-11-17 RX ADMIN — Medication 1 AMPULE: at 08:28

## 2023-11-17 RX ADMIN — ASPIRIN 325 MG: 325 TABLET ORAL at 08:29

## 2023-11-17 RX ADMIN — MORPHINE SULFATE 2 MG: 2 INJECTION, SOLUTION INTRAMUSCULAR; INTRAVENOUS at 17:11

## 2023-11-17 RX ADMIN — INSULIN LISPRO 4 UNITS: 100 INJECTION, SOLUTION INTRAVENOUS; SUBCUTANEOUS at 17:12

## 2023-11-17 RX ADMIN — OXYCODONE HYDROCHLORIDE AND ACETAMINOPHEN 1 TABLET: 5; 325 TABLET ORAL at 05:40

## 2023-11-17 RX ADMIN — SENNOSIDES 8.6 MG: 8.6 TABLET, FILM COATED ORAL at 21:48

## 2023-11-17 RX ADMIN — Medication 1 AMPULE: at 21:51

## 2023-11-17 RX ADMIN — ATORVASTATIN CALCIUM 40 MG: 40 TABLET, FILM COATED ORAL at 21:48

## 2023-11-17 RX ADMIN — INSULIN LISPRO 13 UNITS: 100 INJECTION, SOLUTION INTRAVENOUS; SUBCUTANEOUS at 12:19

## 2023-11-17 RX ADMIN — INSULIN LISPRO 10 UNITS: 100 INJECTION, SOLUTION INTRAVENOUS; SUBCUTANEOUS at 08:29

## 2023-11-17 RX ADMIN — PIPERACILLIN AND TAZOBACTAM 3375 MG: 3; .375 INJECTION, POWDER, LYOPHILIZED, FOR SOLUTION INTRAVENOUS at 05:33

## 2023-11-17 RX ADMIN — HEPARIN SODIUM 5000 UNITS: 5000 INJECTION INTRAVENOUS; SUBCUTANEOUS at 14:10

## 2023-11-17 RX ADMIN — INSULIN LISPRO 13 UNITS: 100 INJECTION, SOLUTION INTRAVENOUS; SUBCUTANEOUS at 17:12

## 2023-11-17 RX ADMIN — SODIUM CHLORIDE, PRESERVATIVE FREE 10 ML: 5 INJECTION INTRAVENOUS at 08:30

## 2023-11-17 RX ADMIN — HEPARIN SODIUM 5000 UNITS: 5000 INJECTION INTRAVENOUS; SUBCUTANEOUS at 21:48

## 2023-11-17 RX ADMIN — HEPARIN SODIUM 5000 UNITS: 5000 INJECTION INTRAVENOUS; SUBCUTANEOUS at 05:31

## 2023-11-17 RX ADMIN — INSULIN GLARGINE 45 UNITS: 100 INJECTION, SOLUTION SUBCUTANEOUS at 21:47

## 2023-11-17 RX ADMIN — CLOPIDOGREL BISULFATE 75 MG: 75 TABLET ORAL at 08:29

## 2023-11-17 RX ADMIN — INSULIN LISPRO 4 UNITS: 100 INJECTION, SOLUTION INTRAVENOUS; SUBCUTANEOUS at 08:31

## 2023-11-17 ASSESSMENT — PAIN DESCRIPTION - DESCRIPTORS
DESCRIPTORS: ACHING;BURNING
DESCRIPTORS: BURNING;SORE

## 2023-11-17 ASSESSMENT — PAIN DESCRIPTION - LOCATION
LOCATION: BUTTOCKS
LOCATION: OTHER (COMMENT)

## 2023-11-17 ASSESSMENT — PAIN SCALES - GENERAL
PAINLEVEL_OUTOF10: 0
PAINLEVEL_OUTOF10: 6
PAINLEVEL_OUTOF10: 2
PAINLEVEL_OUTOF10: 0
PAINLEVEL_OUTOF10: 8

## 2023-11-17 ASSESSMENT — PAIN DESCRIPTION - ORIENTATION: ORIENTATION: POSTERIOR

## 2023-11-17 NOTE — CARE COORDINATION
Transition of Care Plan:     RUR: 13 % (low RUR)  Prior Level of Functioning: Independent  Disposition: IPR vs Home  If SNF or IPR: Date FOC offered: 11/16/2023  Date FOC received: 11/16/2023  Accepting facility: Referrals pending  Date authorization started with reference number:   Date authorization received and expires: Follow up appointments: defer to IPR vs to be scheduled  DME needed: defer to IPR  Transportation at discharge: Family vs BLS  IM/IMM Medicare/ letter given: n/a - commercial  Is patient a [place] and connected with VA? N/a              If yes, was [employer] transfer form completed and VA notified? N/a  Caregiver Contact:   Discharge Caregiver contacted prior to discharge? Pt to contact  Care Conference needed? Not at this time  Barriers to discharge: Medical clearance     1404 - Pt adamantly declining IPR at this time. PT/OT discussed rec with pt. Pt prefers to return to [place] with family. Mother to come to [place] over the weekend and will provide transport. CM to cancel referrals.      CHRISSY Valdez  Care Management

## 2023-11-17 NOTE — PLAN OF CARE
Problem: Physical Therapy - Adult  Goal: By Discharge: Performs mobility at highest level of function for planned discharge setting. See evaluation for individualized goals. Description: FUNCTIONAL STATUS PRIOR TO ADMISSION: Patient was independent and active without use of DME. Lives in [place] but was traveling to [address] [address] for work when symptoms occurred. HOME SUPPORT PRIOR TO ADMISSION: The patient lived alone with family in the area to provide assistance. (Reports he is currently getting )    Physical Therapy Goals  Initiated 11/16/2023  1. Patient will move from supine to sit and sit to supine in bed with modified independence within 7 day(s). 2.  Patient will perform sit to stand with supervision/set-up within 7 day(s). 3.  Patient will transfer from bed to chair and chair to bed with contact guard assist using the least restrictive device within 7 day(s). 4.  Patient will ambulate with contact guard assist for 150 feet with the least restrictive device within 7 day(s). Outcome: Progressing   PHYSICAL THERAPY TREATMENT    Patient: Summer Gallegos (52 y.o. male)  Date: 11/17/2023  Diagnosis: Hyperglycemia [R73.9]  Rectal mass [K62.89] Rectal mass  Procedure(s) (LRB):  RECTAL EXAM UNDER ANESTHESIA, BIOPSY OF RECTAL MASS AND WASHOUT (N/A) 3 Days Post-Op  Precautions: Fall Risk, Bed Alarm                    ASSESSMENT:  Patient continues to benefit from skilled PT services and is progressing towards goals. Pt received for PT treatment sitting in room chair, agreeable to therapy. Muscle strength reassessed in sitting, DF remain 0/5 but PF were 1/5 and Quads were 3-/5 this session. Pt performed sit<>stand to RW requiring CGA due to R sided weakness. Pt ambulated 30 ft for 6 bouts with a RW requiring CGA due to safety concerns.  Pt ambulated with a hemiplegic gait pattern demonstrating decreased R step length, slight R sided steppage gait and required verbal cues to perform proper initial (unsupported)  Standing: Impaired  Standing - Static: Good;Constant support  Standing - Dynamic: Good;Constant support   Ambulation/Gait Training:     Gait  Overall Level of Assistance: Contact-guard assistance;Assist X1  Distance (ft): 30 Feet (x6)  Assistive Device: Walker, rolling;Gait belt  Interventions: Safety awareness training;Verbal cues  Base of Support: Center of gravity altered  Speed/Heydi: Slow  Step Length: Right shortened  Swing Pattern: Right asymmetrical  Stance: Left increased  Gait Abnormalities: Antalgic;Decreased step clearance  Therapeutic Exercise:   Seated therex performed including: R heel raises, R LAQs, Marching, B hip add with pillow for 1 x 10 each           Pain Rating:  None reported this session. Activity Tolerance:   Good    After treatment:   Patient left in no apparent distress sitting up in chair, Call bell within reach, Bed/ chair alarm activated, and in care of nurse      COMMUNICATION/EDUCATION:   The patient's plan of care was discussed with: occupational therapist, registered nurse, and     Patient Education  Education Given To: Patient  Education Provided: Role of Therapy;Plan of Care;Home Exercise Program  Education Method: Verbal;Demonstration  Barriers to Learning: None  Education Outcome: Verbalized understanding      CARI Padilla  Minutes: 33     Regarding student involvement in patient care:  A student participated in this treatment session. Per CMS Medicare statements and APTA guidelines I certify that the following was true:  1. I was present and directly observed the entire session. 2. I made all skilled judgments and clinical decisions regarding care. 3. I am the practitioner responsible for assessment, treatment, and documentation.

## 2023-11-17 NOTE — PLAN OF CARE
Problem: Pain  Goal: Verbalizes/displays adequate comfort level or baseline comfort level  Outcome: Progressing     Problem: Chronic Conditions and Co-morbidities  Goal: Patient's chronic conditions and co-morbidity symptoms are monitored and maintained or improved  Outcome: Progressing     Problem: Discharge Planning  Goal: Discharge to home or other facility with appropriate resources  Outcome: Progressing     Problem: ABCDS Injury Assessment  Goal: Absence of physical injury  Outcome: Progressing     Problem: Safety - Adult  Goal: Free from fall injury  Outcome: Progressing     Problem: Neurosensory - Adult  Goal: Achieves stable or improved neurological status  Outcome: Progressing  Goal: Absence of seizures  Outcome: Progressing  Goal: Remains free of injury related to seizures activity  Outcome: Progressing  Goal: Achieves maximal functionality and self care  Outcome: Progressing     Problem: Respiratory - Adult  Goal: Achieves optimal ventilation and oxygenation  Outcome: Progressing     Problem: Cardiovascular - Adult  Goal: Maintains optimal cardiac output and hemodynamic stability  Outcome: Progressing  Goal: Absence of cardiac dysrhythmias or at baseline  Outcome: Progressing     Problem: Skin/Tissue Integrity - Adult  Goal: Skin integrity remains intact  Outcome: Progressing  Goal: Incisions, wounds, or drain sites healing without S/S of infection  Outcome: Progressing  Goal: Oral mucous membranes remain intact  Outcome: Progressing     Problem: Musculoskeletal - Adult  Goal: Return mobility to safest level of function  Outcome: Progressing  Goal: Maintain proper alignment of affected body part  Outcome: Progressing  Goal: Return ADL status to a safe level of function  Outcome: Progressing     Problem: Gastrointestinal - Adult  Goal: Minimal or absence of nausea and vomiting  Outcome: Progressing  Goal: Maintains or returns to baseline bowel function  Outcome: Progressing  Goal: Maintains adequate

## 2023-11-17 NOTE — PROGRESS NOTES
[facility] 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 His right arm was mildly weak 2 days ago which improved to normal yesterday. He is able to raise the leg and hold it off the bed now. He still has no movement about the ankle nor toe flexion or extension. He has fairly good light touch sensibility in the distal lower extremity. Plan: We will continue on dual antiplatelet therapy for 90 days followed by aspirin monotherapy. He is on a statin now which will be continued indefinitely. He might benefit from inpatient rehab. We have no further recommendations at this time. Barrier to Discharge from Neurologic Perspective: None. Outpatient Neurology Follow Up: He will follow-up in [place] where he lives at some point in the next several weeks. Ankur Flaherty M.D. Diplomate, American Board of Neurology and Psychiatry  Diplomate, American Board of Electrodiagnostic Medicine  Subspecialty Certification, Headache Medicine, [facility]  ____________________________________________________________________    Reason for Visit: Carlita Alvarez is a 46 y.o. y/o male who is seen in the hospital for follow up of stroke. Interval History: Patient notes a significant improvement in his right leg function overnight. It still feels little bit numb. His right arm is doing well. He developed no new neurologic symptomatology. The drain from his rectal abscess will be pulled on Monday.   There is been some discussion about a brief stay in document were created using Dragon dictation software and may contain inadvertent transcription errors.

## 2023-11-17 NOTE — PROGRESS NOTES
End of Shift Note    Bedside shift change report given to RHETT Angeles (oncoming nurse) by Georges Mcleod LPN (offgoing nurse). Report included the following information Nurse Handoff Report      Shift worked:  Nights   Shift summary and any significant changes:    PRN Oxycodone x2 and Hydralazine was giving, Refused Labs  New IV was placed     Concerns for physician to address:  See notes above    Zone phone for oncoming shift:   [phone]     Patient Information  Katia Phipps  46 y.o.  11/14/2023  6:59 AM by Karen Burris MD. Katia Phipps was admitted from Home    Problem List  Patient Active Problem List    Diagnosis Date Noted    Rectal mass 11/14/2023    Hyperglycemia 11/14/2023    Type 1 diabetes mellitus with complication, with long term current use of insulin pump ([address]) 11/14/2023    Hemoglobin A1C greater than 9%, indicating poor diabetic control 11/14/2023     Past Medical History:   Diagnosis Date    Diabetes ([address])     IDDM       Core Measures:  CVA: Yes Yes  CHF:No No  PNA:NoNo    Activity:     Number times ambulated in hallways past shift: 0  Number of times OOB to chair past shift: 0    Cardiac:   Cardiac Monitoring: Yes      Cardiac Rhythm: Sinus rhythm    Access:   Current line(s): PIV  Central Line? No    PICC LINE? No     Genitourinary:   Urinary status: voiding   Urinary Catheter? No     Respiratory:   O2 Device: None (Room air)  Chronic home O2 use?: no  Incentive spirometer at bedside: no       GI:  Last BM (including prior to admit): 11/16/23  Current diet:  ADULT DIET; Regular; 3 carb choices (45 gm/meal)  Passing flatus: yes  Tolerating current diet: yes       Pain Management:   Patient states pain is manageable on current regimen: yes    Skin:  Meño Scale Score: 19  Interventions: increase time out of bed and PT/OT consult    Patient Safety:  Fall Score:  Dominguez Total Score: 45  Interventions: bed/chair alarm, assistive devices (walker, cane, etc.), gripper socks, pt to call before getting OOB, and stay with me (per policy)     @Rollbelt  @dexterity to release roll belt  Yes/No ( must document dexterity  here by stating Yes or No here, otherwise this is a restraint and must follow restraint documentation policy.)    DVT prophylaxis:  DVT prophylaxis Med- yes  DVT prophylaxis SCD or LORRAINE- no     Wounds: (If Applicable)  Wounds- yes  Location rectum abcess    Active Consults:  IP CONSULT TO HOSPITALIST  IP CONSULT TO COLORECTAL SURGERY  IP CONSULT TO DIABETES MANAGEMENT  IP CONSULT TO DIETITIAN  IP CONSULT TO NEUROLOGY    Length of Stay:  Expected LOS: 3  Actual LOS: 3  Discharge Plan: yes BERNABE Renee LPN

## 2023-11-17 NOTE — PROGRESS NOTES
Hospitalist Progress Note    NAME:   Joaquin Guzman   : 1970   MRN: [id number]     Date/Time: 2023 3:06 PM  Patient PCP: Dudley Fuentes MD    Estimated discharge date: 2023  Barriers: Needs neurology eval    Assessment / Plan:     Perirectal abscess  Status post incision and drainage and drain placement under GA  Klebsiella bacteremia  Leukocytosis - WBC 15.1; low grade fever 99.2F  CT abd/pelvis  1. Partially imaged anal mass. Carcinoma favored over abscess. This should be  relatively evident on physical examination. 2. Segment 7 hepatic mass is probably a hemangioma. Liver protocol CT or MRI is  recommended for confirmation. This can be performed on a nonemergent, outpatient  basis. 3. Gallstones and cystic duct stones. Otherwise normal gallbladder. Colorectal surgery -patient underwent I&D yesterday and was cleared for discharge by surgery today with drain in place, oral antibiotics, to follow-up cultures as an outpatient  Blood culture on 2023-Klebsiella  Wound culture with gram-negative rods  Cont IV  Zosyn  Pain management, PRN antipyretics, antiemetics  Bowel regimen  Per surgery-patient can take a bath or shower post bowel movements to clean himself up. Anorexia, unintentional weight loss ~10 lbs  Constipation, nausea without vomiting  Suspect dehydration  Status post IV hydration  Encourage PO intake as tolerated once cleared for diet  Anti-emetics, bowel regimen  Dietician consult     Hypomagnesemia Mg 1.3 - repleted in ED  Check BMP in a.m.      Hypertension, tachycardia - resolved - likely 2/2 pain and infection  Telemetry monitoring  Anti-hypertensives PRN     Type 1 DM - insulin pump  Hyperglycemia without DKA/HHS  Hemoglobin A1c-9.8  Received Insulin Reg 10 units for BS 200s-300s  Pt has own insulin lispro pump and CGM--ran out of insulin   DM management consulted-advised to continue 34 units of Lantus nightly for now  Accuchecks premeals and at bedtime  Monitor for ________________________________________________________________________  Terry Guerra MD     Procedures: see electronic medical records for all procedures/Xrays and details which were not copied into this note but were reviewed prior to creation of Plan. LABS:  I reviewed today's most current labs and imaging studies. Pertinent labs include:  Recent Labs     11/15/23  0407 11/16/23  0100   WBC 14.2* 9.9   HGB 13.9 13.1   HCT 41.0 36.7    318       Recent Labs     11/15/23  0407 11/16/23  0100 11/16/23  0101   *  --  136   K 4.5  --  3.3*     --  100   CO2 22  --  27   GLUCOSE 354*  --  343*   BUN 8  --  9   CREATININE 0.85  --  0.73   CALCIUM 9.0  --  8.7   MG  --  1.6  --          Signed:  Terry Guerra MD

## 2023-11-17 NOTE — PLAN OF CARE
Problem: Occupational Therapy - Adult  Goal: By Discharge: Performs self-care activities at highest level of function for planned discharge setting. See evaluation for individualized goals. Description: FUNCTIONAL STATUS PRIOR TO ADMISSION:  Patient was independent in ADLs and functional mobility. He lives in [facility] but was in [place] for work.    ,  ,  ,  ,  ,  ,  ,  ,  ,  , Active : Yes     HOME SUPPORT: Patient lived alone with no local support. His daughter and mom live in [place] and he would like to return there at discharge if possible. Occupational Therapy Goals:  Initiated 11/16/2023  1. Patient will perform grooming standing at sink with Supervision within 7 day(s). 2.  Patient will perform upper body dressing with Supervision within 7 day(s). 3.  Patient will perform lower body dressing using AE PRN with Minimal Assist within 7 day(s). 4.  Patient will perform toilet transfers with Supervision  within 7 day(s). 5.  Patient will perform all aspects of toileting with Minimal Assist within 7 day(s). 6.  Patient will participate in upper extremity therapeutic exercise/activities with Supervision for 3 minutes within 7 day(s). Outcome: Progressing   OCCUPATIONAL THERAPY TREATMENT  Patient: Fabio Araujo (52 y.o. male)  Date: 11/17/2023  Primary Diagnosis: Hyperglycemia [R73.9]  Rectal mass [K62.89]  Procedure(s) (LRB):  RECTAL EXAM UNDER ANESTHESIA, BIOPSY OF RECTAL MASS AND WASHOUT (N/A) 3 Days Post-Op   Precautions: Fall Risk, Bed Alarm                Chart, occupational therapy assessment, plan of care, and goals were reviewed. ASSESSMENT  Patient continues to benefit from skilled OT services and is progressing towards goals. Patient received sitting in recliner chair and cleared for therapy by nursing. He demonstrated slight improvement in strength and ROM in RLE. Patient completed LB dressing with supervision with education provided on compensatory techniques.  He stood and ambulated in hallway

## 2023-11-18 LAB
ANION GAP SERPL CALC-SCNC: 5 MMOL/L (ref 5–15)
BACTERIA SPEC CULT: NORMAL
BASOPHILS # BLD: 0 K/UL (ref 0–0.1)
BASOPHILS NFR BLD: 0 % (ref 0–1)
BUN SERPL-MCNC: 5 MG/DL (ref 6–20)
BUN/CREAT SERPL: 7 (ref 12–20)
CALCIUM SERPL-MCNC: 8.9 MG/DL (ref 8.5–10.1)
CHLORIDE SERPL-SCNC: 102 MMOL/L (ref 97–108)
CO2 SERPL-SCNC: 31 MMOL/L (ref 21–32)
CREAT SERPL-MCNC: 0.76 MG/DL (ref 0.7–1.3)
DIFFERENTIAL METHOD BLD: ABNORMAL
EOSINOPHIL # BLD: 0.1 K/UL (ref 0–0.4)
EOSINOPHIL NFR BLD: 1 % (ref 0–7)
ERYTHROCYTE [DISTWIDTH] IN BLOOD BY AUTOMATED COUNT: 11.1 % (ref 11.5–14.5)
GLUCOSE BLD STRIP.AUTO-MCNC: 252 MG/DL (ref 65–117)
GLUCOSE BLD STRIP.AUTO-MCNC: 254 MG/DL (ref 65–117)
GLUCOSE BLD STRIP.AUTO-MCNC: 282 MG/DL (ref 65–117)
GLUCOSE BLD STRIP.AUTO-MCNC: 82 MG/DL (ref 65–117)
GLUCOSE SERPL-MCNC: 313 MG/DL (ref 65–100)
HCT VFR BLD AUTO: 38.4 % (ref 36.6–50.3)
HGB BLD-MCNC: 13.7 G/DL (ref 12.1–17)
IMM GRANULOCYTES # BLD AUTO: 0.1 K/UL (ref 0–0.04)
IMM GRANULOCYTES NFR BLD AUTO: 1 % (ref 0–0.5)
LYMPHOCYTES # BLD: 2.7 K/UL (ref 0.8–3.5)
LYMPHOCYTES NFR BLD: 30 % (ref 12–49)
MCH RBC QN AUTO: 31.9 PG (ref 26–34)
MCHC RBC AUTO-ENTMCNC: 35.7 G/DL (ref 30–36.5)
MCV RBC AUTO: 89.5 FL (ref 80–99)
MONOCYTES # BLD: 0.9 K/UL (ref 0–1)
MONOCYTES NFR BLD: 10 % (ref 5–13)
NEUTS SEG # BLD: 5.1 K/UL (ref 1.8–8)
NEUTS SEG NFR BLD: 58 % (ref 32–75)
NRBC # BLD: 0 K/UL (ref 0–0.01)
NRBC BLD-RTO: 0 PER 100 WBC
PLATELET # BLD AUTO: 378 K/UL (ref 150–400)
PMV BLD AUTO: 8.9 FL (ref 8.9–12.9)
POTASSIUM SERPL-SCNC: 3.3 MMOL/L (ref 3.5–5.1)
RBC # BLD AUTO: 4.29 M/UL (ref 4.1–5.7)
SERVICE CMNT-IMP: ABNORMAL
SERVICE CMNT-IMP: NORMAL
SERVICE CMNT-IMP: NORMAL
SODIUM SERPL-SCNC: 138 MMOL/L (ref 136–145)
WBC # BLD AUTO: 8.9 K/UL (ref 4.1–11.1)

## 2023-11-18 PROCEDURE — 6360000002 HC RX W HCPCS

## 2023-11-18 PROCEDURE — 6360000002 HC RX W HCPCS: Performed by: STUDENT IN AN ORGANIZED HEALTH CARE EDUCATION/TRAINING PROGRAM

## 2023-11-18 PROCEDURE — 6370000000 HC RX 637 (ALT 250 FOR IP): Performed by: PSYCHIATRY & NEUROLOGY

## 2023-11-18 PROCEDURE — 6370000000 HC RX 637 (ALT 250 FOR IP)

## 2023-11-18 PROCEDURE — 2580000003 HC RX 258

## 2023-11-18 PROCEDURE — 1100000003 HC PRIVATE W/ TELEMETRY

## 2023-11-18 PROCEDURE — 6370000000 HC RX 637 (ALT 250 FOR IP): Performed by: INTERNAL MEDICINE

## 2023-11-18 PROCEDURE — 85025 COMPLETE CBC W/AUTO DIFF WBC: CPT

## 2023-11-18 PROCEDURE — 36415 COLL VENOUS BLD VENIPUNCTURE: CPT

## 2023-11-18 PROCEDURE — 82962 GLUCOSE BLOOD TEST: CPT

## 2023-11-18 PROCEDURE — 80048 BASIC METABOLIC PNL TOTAL CA: CPT

## 2023-11-18 PROCEDURE — 6370000000 HC RX 637 (ALT 250 FOR IP): Performed by: SURGERY

## 2023-11-18 RX ORDER — POTASSIUM CHLORIDE 1.5 G/1.58G
20 POWDER, FOR SOLUTION ORAL ONCE
Status: DISCONTINUED | OUTPATIENT
Start: 2023-11-18 | End: 2023-11-18 | Stop reason: CLARIF

## 2023-11-18 RX ADMIN — INSULIN LISPRO 13 UNITS: 100 INJECTION, SOLUTION INTRAVENOUS; SUBCUTANEOUS at 09:50

## 2023-11-18 RX ADMIN — HEPARIN SODIUM 5000 UNITS: 5000 INJECTION INTRAVENOUS; SUBCUTANEOUS at 21:54

## 2023-11-18 RX ADMIN — ATORVASTATIN CALCIUM 40 MG: 40 TABLET, FILM COATED ORAL at 23:23

## 2023-11-18 RX ADMIN — ASPIRIN 325 MG: 325 TABLET ORAL at 09:44

## 2023-11-18 RX ADMIN — Medication 1 AMPULE: at 21:54

## 2023-11-18 RX ADMIN — CLOPIDOGREL BISULFATE 75 MG: 75 TABLET ORAL at 09:44

## 2023-11-18 RX ADMIN — INSULIN LISPRO 13 UNITS: 100 INJECTION, SOLUTION INTRAVENOUS; SUBCUTANEOUS at 16:57

## 2023-11-18 RX ADMIN — HEPARIN SODIUM 5000 UNITS: 5000 INJECTION INTRAVENOUS; SUBCUTANEOUS at 13:58

## 2023-11-18 RX ADMIN — PIPERACILLIN AND TAZOBACTAM 3375 MG: 3; .375 INJECTION, POWDER, LYOPHILIZED, FOR SOLUTION INTRAVENOUS at 05:08

## 2023-11-18 RX ADMIN — OXYCODONE HYDROCHLORIDE AND ACETAMINOPHEN 1 TABLET: 5; 325 TABLET ORAL at 05:04

## 2023-11-18 RX ADMIN — PIPERACILLIN AND TAZOBACTAM 3375 MG: 3; .375 INJECTION, POWDER, LYOPHILIZED, FOR SOLUTION INTRAVENOUS at 21:54

## 2023-11-18 RX ADMIN — HEPARIN SODIUM 5000 UNITS: 5000 INJECTION INTRAVENOUS; SUBCUTANEOUS at 05:05

## 2023-11-18 RX ADMIN — INSULIN GLARGINE 45 UNITS: 100 INJECTION, SOLUTION SUBCUTANEOUS at 21:00

## 2023-11-18 RX ADMIN — INSULIN LISPRO 13 UNITS: 100 INJECTION, SOLUTION INTRAVENOUS; SUBCUTANEOUS at 12:29

## 2023-11-18 RX ADMIN — OXYCODONE HYDROCHLORIDE AND ACETAMINOPHEN 1 TABLET: 5; 325 TABLET ORAL at 15:22

## 2023-11-18 RX ADMIN — INSULIN LISPRO 2 UNITS: 100 INJECTION, SOLUTION INTRAVENOUS; SUBCUTANEOUS at 12:30

## 2023-11-18 RX ADMIN — PIPERACILLIN AND TAZOBACTAM 3375 MG: 3; .375 INJECTION, POWDER, LYOPHILIZED, FOR SOLUTION INTRAVENOUS at 13:58

## 2023-11-18 RX ADMIN — INSULIN LISPRO 4 UNITS: 100 INJECTION, SOLUTION INTRAVENOUS; SUBCUTANEOUS at 09:51

## 2023-11-18 RX ADMIN — POTASSIUM BICARBONATE 20 MEQ: 782 TABLET, EFFERVESCENT ORAL at 12:35

## 2023-11-18 RX ADMIN — SODIUM CHLORIDE, PRESERVATIVE FREE 10 ML: 5 INJECTION INTRAVENOUS at 13:58

## 2023-11-18 RX ADMIN — SODIUM CHLORIDE, PRESERVATIVE FREE 10 ML: 5 INJECTION INTRAVENOUS at 09:49

## 2023-11-18 RX ADMIN — POLYETHYLENE GLYCOL (3350) 17 G: 17 POWDER, FOR SOLUTION ORAL at 09:45

## 2023-11-18 RX ADMIN — INSULIN LISPRO 2 UNITS: 100 INJECTION, SOLUTION INTRAVENOUS; SUBCUTANEOUS at 16:57

## 2023-11-18 ASSESSMENT — PAIN DESCRIPTION - ORIENTATION
ORIENTATION: RIGHT
ORIENTATION: MID

## 2023-11-18 ASSESSMENT — PAIN DESCRIPTION - DESCRIPTORS
DESCRIPTORS: SHOOTING
DESCRIPTORS: ACHING

## 2023-11-18 ASSESSMENT — PAIN DESCRIPTION - LOCATION
LOCATION: RECTUM
LOCATION: LEG

## 2023-11-18 ASSESSMENT — PAIN SCALES - GENERAL
PAINLEVEL_OUTOF10: 3
PAINLEVEL_OUTOF10: 1
PAINLEVEL_OUTOF10: 6
PAINLEVEL_OUTOF10: 6

## 2023-11-18 NOTE — PLAN OF CARE
Problem: Safety - Adult  Goal: Free from fall injury  Flowsheets (Taken 11/18/2023 7713)  Free From Fall Injury:   Instruct family/caregiver on patient safety   Based on caregiver fall risk screen, instruct family/caregiver to ask for assistance with transferring infant if caregiver noted to have fall risk factors

## 2023-11-18 NOTE — PROGRESS NOTES
End of Shift Note    Bedside shift change report given to [facility] (oncoming nurse) by Naren Anders RN (offgoing nurse). Report included the following information SBAR, Kardex, and MAR    Shift worked:  7p-7a     Shift summary and any significant changes:     Humalog was held due to the patient's blood glucose level, see MAR. Scheduled medications were given, see MAR. Patient was given Percocet once, see PRN MAR.  IV has been flushed and is patent. Morning lab was done. Patient is up with the assistance of one to the bathroom. Patient teaching and routine rounding has been done. Concerns for physician to address:       Zone phone for oncoming shift:          Activity:     Number times ambulated in hallways past shift: 0  Number of times OOB to chair past shift: 0    Cardiac:   Cardiac Monitoring: Yes           Access:  Current line(s): PIV     Genitourinary:   Urinary status: voiding    Respiratory:      Chronic home O2 use?: NO  Incentive spirometer at bedside: NO       GI:     Current diet:  ADULT DIET;  Regular; 3 carb choices (45 gm/meal)  Passing flatus: YES  Tolerating current diet: YES       Pain Management:   Patient states pain is manageable on current regimen: YES    Skin:     Interventions: float heels and increase time out of bed    Patient Safety:  Fall Score:    Interventions: bed/chair alarm, gripper socks, and pt to call before getting OOB       Length of Stay:  Expected LOS: 3  Actual LOS: [address], RN

## 2023-11-18 NOTE — PROGRESS NOTES
Hospitalist Progress Note    NAME:   Rafa Contreras   : 1970   MRN: [id number]     Date/Time: 2023 11:30 AM  Patient PCP: Carito Castellano MD    Estimated discharge date:   Barriers: surgery clearance     Assessment / Plan:     Perirectal abscess  Status post incision and drainage and drain placement under GA  Klebsiella bacteremia  Leukocytosis - WBC 15.1; low grade fever 99.2F  CT abd/pelvis  1. Partially imaged anal mass. Carcinoma favored over abscess. This should be  relatively evident on physical examination. 2. Segment 7 hepatic mass is probably a hemangioma. Liver protocol CT or MRI is  recommended for confirmation. This can be performed on a nonemergent, outpatient  basis. 3. Gallstones and cystic duct stones. Otherwise normal gallbladder. Colorectal surgery -patient underwent I&D yesterday and was cleared for discharge by surgery today with drain in place, oral antibiotics, to follow-up cultures as an outpatient  Blood culture on 2023-Klebsiella  Wound culture with gram-negative rods  Cont IV  Zosyn  Pain management, PRN antipyretics, antiemetics  Repeat blood cx        Anorexia, unintentional weight loss ~10 lbs  Constipation, nausea without vomiting  Suspect dehydration  Status post IV hydration  Encourage PO intake as tolerated once cleared for diet  Anti-emetics, bowel regimen  Dietician consult     Hypomagnesemia Mg 1.3 - repleted in ED  Check BMP in a.m.      Hypertension, tachycardia - resolved - likely 2/2 pain and infection  Telemetry monitoring  Anti-hypertensives PRN     Type 1 DM - insulin pump  Hyperglycemia without DKA/HHS  Hemoglobin A1c-9.8  Received Insulin Reg 10 units for BS 200s-300s  Pt has own insulin lispro pump and CGM--ran out of insulin   DM management consulted-advised to continue 34 units of Lantus nightly for now  Accuchecks premeals and at bedtime  Monitor for s/s hypo/hyperglycemia  Hypoglycemia treatment per protocol    Acute and subacute rales. No accessory muscle use  CV:  Regular  rhythm,  No edema  GI:  Soft, Non distended, Non tender. +Bowel sounds  Neurologic:  Alert and oriented X 3, normal speech, cranial nerves-intact EOMI, PERRLA, good shrugging of shoulders good tongue movement good facial sensation and positive facial muscle strength. Sensation-preserved in bilateral lower extremity and upper extremity. Motor function-during my examination when the patient had an episode of weakness-power 0 /5 and all the major muscle groups in right lower extremity tone reduced, reflexes however preserved in knee  Repeat examination within 1 to 2 minutes-power around 3 /5 in major muscle groups sensation preserved reflexes intact    Psych:   Good insight. Slightly anxious about his new onset weakness. Skin:  No rashes. No jaundice. Status post incision debridement of pararectal abscess with dressing in place  Back examination: No spinal tenderness or spasm. Reviewed most current lab test results and cultures  YES  Reviewed most current radiology test results   YES  Review and summation of old records today    NO  Reviewed patient's current orders and MAR    YES  PMH/SH reviewed - no change compared to H&P    ________________________________________________________________________  Total NON critical care TIME:  55  Minutes    Total CRITICAL CARE TIME Spent:   Minutes non procedure based      Comments   >50% of visit spent in counseling and coordination of care     ________________________________________________________________________  Sumit Mcmillan MD     Procedures: see electronic medical records for all procedures/Xrays and details which were not copied into this note but were reviewed prior to creation of Plan. LABS:  I reviewed today's most current labs and imaging studies.   Pertinent labs include:  Recent Labs     11/16/23  0100 11/18/23  0458   WBC 9.9 8.9   HGB 13.1 13.7   HCT 36.7 38.4    378       Recent Labs 11/16/23  0100 11/16/23  0101 11/18/23  0458   NA  --  136 138   K  --  3.3* 3.3*   CL  --  100 102   CO2  --  27 31   GLUCOSE  --  343* 313*   BUN  --  9 5*   CREATININE  --  0.73 0.76   CALCIUM  --  8.7 8.9   MG 1.6  --   --          Signed:  Eduard Nguyen MD

## 2023-11-18 NOTE — PROGRESS NOTES
End of Shift Note     Bedside shift change report given to Zane Martinez R.N (oncoming nurse) by Ankit Gloria RN (offgoing nurse). Report included the following information Nurse Handoff Report, Intake/Output, MAR, Recent Results, and Cardiac Rhythm sinus tach        Shift worked:  7a-7p   Shift summary and any significant changes:     PRN  meds      Concerns for physician to address: none   Zone phone for oncoming shift:  [phone]                 Patient Information  Luis Carlos Givens  46 y.o.  11/14/2023  6:59 AM by Briseyda Morejon MD. Luis Carlos Givens was admitted from Home     Problem List       Patient Active Problem List     Diagnosis Date Noted    Rectal mass 11/14/2023    Hyperglycemia 11/14/2023    Type 1 diabetes mellitus with complication, with long term current use of insulin pump ([address]) 11/14/2023    Hemoglobin A1C greater than 9%, indicating poor diabetic control 11/14/2023      Past Medical History        Past Medical History:   Diagnosis Date    Diabetes ([address])       IDDM            Core Measures:  CVA: Yes Yes  CHF:No No  PNA:NoNo     Activity:  Number times ambulated in hallways past shift: 0  Number of times OOB to chair past shift: 1     Cardiac:   Cardiac Monitoring: Yes      Cardiac Rhythm: Sinus rhythm     Access:   Current line(s): PIV     Genitourinary:   Urinary status: voiding      Respiratory:   O2 Device: None (Room air)  Chronic home O2 use?: no  Incentive spirometer at bedside: no     GI:  Last BM (including prior to admit): 11/18/23  Current diet:  ADULT DIET; Regular; 3 carb choices (45 gm/meal)  Passing flatus: yes  Tolerating current diet: yes     Pain Management:   Patient states pain is manageable on current regimen: yes     Skin:  Meño Scale Score: 19  Interventions: increase time out of bed    Patient Safety:  Fall Score:  Dominguez Total Score: 35  Interventions: bed/chair alarm, assistive devices (walker, cane, etc.), gripper socks, pt to call before getting OOB, and stay with me (per

## 2023-11-19 LAB
GLUCOSE BLD STRIP.AUTO-MCNC: 230 MG/DL (ref 65–117)
GLUCOSE BLD STRIP.AUTO-MCNC: 269 MG/DL (ref 65–117)
GLUCOSE BLD STRIP.AUTO-MCNC: 324 MG/DL (ref 65–117)
GLUCOSE BLD STRIP.AUTO-MCNC: 362 MG/DL (ref 65–117)
SERVICE CMNT-IMP: ABNORMAL

## 2023-11-19 PROCEDURE — 6370000000 HC RX 637 (ALT 250 FOR IP)

## 2023-11-19 PROCEDURE — 6360000002 HC RX W HCPCS: Performed by: STUDENT IN AN ORGANIZED HEALTH CARE EDUCATION/TRAINING PROGRAM

## 2023-11-19 PROCEDURE — 6370000000 HC RX 637 (ALT 250 FOR IP): Performed by: SURGERY

## 2023-11-19 PROCEDURE — 1100000003 HC PRIVATE W/ TELEMETRY

## 2023-11-19 PROCEDURE — 82962 GLUCOSE BLOOD TEST: CPT

## 2023-11-19 PROCEDURE — 6370000000 HC RX 637 (ALT 250 FOR IP): Performed by: PSYCHIATRY & NEUROLOGY

## 2023-11-19 PROCEDURE — 2580000003 HC RX 258

## 2023-11-19 RX ADMIN — INSULIN LISPRO 4 UNITS: 100 INJECTION, SOLUTION INTRAVENOUS; SUBCUTANEOUS at 22:01

## 2023-11-19 RX ADMIN — INSULIN LISPRO 2 UNITS: 100 INJECTION, SOLUTION INTRAVENOUS; SUBCUTANEOUS at 16:45

## 2023-11-19 RX ADMIN — HEPARIN SODIUM 5000 UNITS: 5000 INJECTION INTRAVENOUS; SUBCUTANEOUS at 05:59

## 2023-11-19 RX ADMIN — INSULIN LISPRO 13 UNITS: 100 INJECTION, SOLUTION INTRAVENOUS; SUBCUTANEOUS at 11:45

## 2023-11-19 RX ADMIN — ASPIRIN 325 MG: 325 TABLET ORAL at 07:52

## 2023-11-19 RX ADMIN — INSULIN LISPRO 13 UNITS: 100 INJECTION, SOLUTION INTRAVENOUS; SUBCUTANEOUS at 16:45

## 2023-11-19 RX ADMIN — OXYCODONE HYDROCHLORIDE AND ACETAMINOPHEN 1 TABLET: 5; 325 TABLET ORAL at 20:12

## 2023-11-19 RX ADMIN — SODIUM CHLORIDE, PRESERVATIVE FREE 10 ML: 5 INJECTION INTRAVENOUS at 07:53

## 2023-11-19 RX ADMIN — INSULIN LISPRO 4 UNITS: 100 INJECTION, SOLUTION INTRAVENOUS; SUBCUTANEOUS at 11:45

## 2023-11-19 RX ADMIN — INSULIN GLARGINE 45 UNITS: 100 INJECTION, SOLUTION SUBCUTANEOUS at 21:00

## 2023-11-19 RX ADMIN — HEPARIN SODIUM 5000 UNITS: 5000 INJECTION INTRAVENOUS; SUBCUTANEOUS at 22:00

## 2023-11-19 RX ADMIN — OXYCODONE HYDROCHLORIDE AND ACETAMINOPHEN 1 TABLET: 5; 325 TABLET ORAL at 01:04

## 2023-11-19 RX ADMIN — CLOPIDOGREL BISULFATE 75 MG: 75 TABLET ORAL at 07:52

## 2023-11-19 RX ADMIN — Medication 1 AMPULE: at 20:12

## 2023-11-19 RX ADMIN — ATORVASTATIN CALCIUM 40 MG: 40 TABLET, FILM COATED ORAL at 20:13

## 2023-11-19 RX ADMIN — INSULIN LISPRO 6 UNITS: 100 INJECTION, SOLUTION INTRAVENOUS; SUBCUTANEOUS at 07:54

## 2023-11-19 RX ADMIN — Medication 1 AMPULE: at 07:53

## 2023-11-19 RX ADMIN — INSULIN LISPRO 13 UNITS: 100 INJECTION, SOLUTION INTRAVENOUS; SUBCUTANEOUS at 07:52

## 2023-11-19 RX ADMIN — HEPARIN SODIUM 5000 UNITS: 5000 INJECTION INTRAVENOUS; SUBCUTANEOUS at 14:45

## 2023-11-19 RX ADMIN — SODIUM CHLORIDE, PRESERVATIVE FREE 10 ML: 5 INJECTION INTRAVENOUS at 20:30

## 2023-11-19 ASSESSMENT — PAIN DESCRIPTION - LOCATION
LOCATION: OTHER (COMMENT)
LOCATION: OTHER (COMMENT)

## 2023-11-19 ASSESSMENT — PAIN DESCRIPTION - ONSET
ONSET: SUDDEN
ONSET: PROGRESSIVE

## 2023-11-19 ASSESSMENT — PAIN DESCRIPTION - DESCRIPTORS
DESCRIPTORS: BURNING
DESCRIPTORS: BURNING

## 2023-11-19 ASSESSMENT — PAIN SCALES - GENERAL
PAINLEVEL_OUTOF10: 5
PAINLEVEL_OUTOF10: 0
PAINLEVEL_OUTOF10: 7

## 2023-11-19 ASSESSMENT — PAIN - FUNCTIONAL ASSESSMENT
PAIN_FUNCTIONAL_ASSESSMENT: ACTIVITIES ARE NOT PREVENTED
PAIN_FUNCTIONAL_ASSESSMENT: ACTIVITIES ARE NOT PREVENTED

## 2023-11-19 ASSESSMENT — PAIN SCALES - WONG BAKER: WONGBAKER_NUMERICALRESPONSE: 0

## 2023-11-19 ASSESSMENT — PAIN DESCRIPTION - PAIN TYPE
TYPE: SURGICAL PAIN
TYPE: ACUTE PAIN

## 2023-11-19 ASSESSMENT — PAIN DESCRIPTION - FREQUENCY
FREQUENCY: INTERMITTENT
FREQUENCY: INTERMITTENT

## 2023-11-19 ASSESSMENT — PAIN DESCRIPTION - ORIENTATION
ORIENTATION: POSTERIOR
ORIENTATION: POSTERIOR

## 2023-11-19 NOTE — PROGRESS NOTES
End of Shift Note    Bedside shift change report given to RHETT Angeles (oncoming nurse) by Reese Delacruz RN (offgoing nurse). Report included the following information Nurse Handoff Report, MAR, Recent Results, Med Rec Status, Quality Measures, and Neuro Assessment      Shift worked:  7P-7A   Shift summary and any significant changes:     No significant change. Concerns for physician to address:  None   Zone phone for oncoming shift:   [phone]     Patient Information  Edinson Matias  46 y.o.  11/14/2023  6:59 AM by Billy Panda MD. Edinson Matias was admitted from Home    Problem List  Patient Active Problem List    Diagnosis Date Noted    Rectal mass 11/14/2023    Hyperglycemia 11/14/2023    Type 1 diabetes mellitus with complication, with long term current use of insulin pump (HCC) 11/14/2023    Hemoglobin A1C greater than 9%, indicating poor diabetic control 11/14/2023     Past Medical History:   Diagnosis Date    Diabetes ([facility])     IDDM       Core Measures:  CVA: Yes Yes  CHF:No No  PNA:NoNo    Activity:  Activity: In bed  Number times ambulated in hallways past shift: 0  Number of times OOB to chair past shift: 0    Cardiac:   Cardiac Monitoring: Yes      Cardiac Rhythm: Sinus rhythm    Access:   Current line(s): PIV  Central Line? No Placement date  Reason Medically Necessary   PICC LINE? No Placement date Reason Medically Necessary     Genitourinary:   Urinary status:   Urinary Catheter? Placement Date  Reason Medically Necessary     Respiratory:   O2 Device: None (Room air)  Chronic home O2 use?:   Incentive spirometer at bedside:        GI:  Last BM (including prior to admit): 11/17/23  Current diet:  ADULT DIET; Regular; 3 carb choices (45 gm/meal)  Passing flatus: Tolerating current diet:        Pain Management:   Patient states pain is manageable on current regimen:     Skin:  Meño Scale Score: 20  Interventions: increase time out of bed    Patient Safety:  Fall Score:  Dominguez Total Score: 15  Interventions: bed/chair alarm, gripper socks, pt to call before getting OOB, and stay with me (per policy)     @Rollbelt  @dexterity to release roll belt  Yes/No ( must document dexterity  here by stating Yes or No here, otherwise this is a restraint and must follow restraint documentation policy.)    DVT prophylaxis:  DVT prophylaxis Med- yes  DVT prophylaxis SCD or LORRAINE- no     Wounds: (If Applicable)  Wounds- yes  Location rectum    Active Consults:  IP CONSULT TO HOSPITALIST  IP CONSULT TO COLORECTAL SURGERY  IP CONSULT TO DIABETES MANAGEMENT  IP CONSULT TO DIETITIAN  IP CONSULT TO NEUROLOGY    Length of Stay:  Expected LOS: 3  Actual LOS: 5  Discharge Plan: yes 11/21      Arcenio Siegel RN

## 2023-11-19 NOTE — PROGRESS NOTES
Hospitalist Progress Note    NAME:   Florentino Kim   : 1970   MRN: [id number]     Date/Time: 2023 11:11 AM  Patient PCP: Chantel Wilson MD    Estimated discharge date:   Barriers: surgery clearance     Assessment / Plan:     Perirectal abscess  Status post incision and drainage and drain placement under GA  Klebsiella bacteremia  Leukocytosis - WBC 15.1; low grade fever 99.2F  CT abd/pelvis  1. Partially imaged anal mass. Carcinoma favored over abscess. This should be  relatively evident on physical examination. 2. Segment 7 hepatic mass is probably a hemangioma. Liver protocol CT or MRI is  recommended for confirmation. This can be performed on a nonemergent, outpatient  basis. 3. Gallstones and cystic duct stones. Otherwise normal gallbladder. Colorectal surgery -patient underwent I&D yesterday and was cleared for discharge by surgery today with drain in place, oral antibiotics, to follow-up cultures as an outpatient  Blood culture on 2023-Klebsiella  Wound culture with gram-negative rods  Cont IV  Zosyn  Pain management, PRN antipyretics, antiemetics  Repeat blood cx        Anorexia, unintentional weight loss ~10 lbs  Constipation, nausea without vomiting  Suspect dehydration  Status post IV hydration  Encourage PO intake as tolerated once cleared for diet  Anti-emetics, bowel regimen  Dietician consult     Hypomagnesemia Mg 1.3 - repleted in ED  Check BMP in a.m.      Hypertension, tachycardia - resolved - likely 2/2 pain and infection  Telemetry monitoring  Anti-hypertensives PRN     Type 1 DM - insulin pump  Hyperglycemia without DKA/HHS  Hemoglobin A1c-9.8  Received Insulin Reg 10 units for BS 200s-300s  Pt has own insulin lispro pump and CGM--ran out of insulin   DM management consulted-advised to continue 34 units of Lantus nightly for now  Accuchecks premeals and at bedtime  Monitor for s/s hypo/hyperglycemia  Hypoglycemia treatment per protocol    Acute and subacute

## 2023-11-19 NOTE — PLAN OF CARE
Problem: Safety - Adult  Goal: Free from fall injury  11/18/2023 1929 by Madalyn Dimas RN  Flowsheets (Taken 11/18/2023 1929)  Free From Fall Injury:   Instruct family/caregiver on patient safety   Based on caregiver fall risk screen, instruct family/caregiver to ask for assistance with transferring infant if caregiver noted to have fall risk factors  11/18/2023 1844 by Madalyn Dimas RN  Flowsheets (Taken 11/18/2023 1844)  Free From Fall Injury:   Instruct family/caregiver on patient safety   Based on caregiver fall risk screen, instruct family/caregiver to ask for assistance with transferring infant if caregiver noted to have fall risk factors

## 2023-11-20 VITALS
WEIGHT: 203 LBS | BODY MASS INDEX: 31.86 KG/M2 | OXYGEN SATURATION: 98 % | DIASTOLIC BLOOD PRESSURE: 90 MMHG | TEMPERATURE: 97 F | RESPIRATION RATE: 15 BRPM | HEIGHT: 67 IN | SYSTOLIC BLOOD PRESSURE: 147 MMHG | HEART RATE: 76 BPM

## 2023-11-20 LAB
GLUCOSE BLD STRIP.AUTO-MCNC: 243 MG/DL (ref 65–117)
GLUCOSE BLD STRIP.AUTO-MCNC: 261 MG/DL (ref 65–117)
SERVICE CMNT-IMP: ABNORMAL
SERVICE CMNT-IMP: ABNORMAL

## 2023-11-20 PROCEDURE — 6370000000 HC RX 637 (ALT 250 FOR IP): Performed by: PSYCHIATRY & NEUROLOGY

## 2023-11-20 PROCEDURE — 6370000000 HC RX 637 (ALT 250 FOR IP)

## 2023-11-20 PROCEDURE — 99232 SBSQ HOSP IP/OBS MODERATE 35: CPT

## 2023-11-20 PROCEDURE — 6360000002 HC RX W HCPCS: Performed by: STUDENT IN AN ORGANIZED HEALTH CARE EDUCATION/TRAINING PROGRAM

## 2023-11-20 PROCEDURE — 6370000000 HC RX 637 (ALT 250 FOR IP): Performed by: SURGERY

## 2023-11-20 PROCEDURE — 99024 POSTOP FOLLOW-UP VISIT: CPT | Performed by: NURSE PRACTITIONER

## 2023-11-20 PROCEDURE — 2580000003 HC RX 258

## 2023-11-20 PROCEDURE — 97535 SELF CARE MNGMENT TRAINING: CPT

## 2023-11-20 PROCEDURE — 97116 GAIT TRAINING THERAPY: CPT

## 2023-11-20 PROCEDURE — 82962 GLUCOSE BLOOD TEST: CPT

## 2023-11-20 RX ORDER — INSULIN LISPRO 100 [IU]/ML
16 INJECTION, SOLUTION INTRAVENOUS; SUBCUTANEOUS
Status: DISCONTINUED | OUTPATIENT
Start: 2023-11-20 | End: 2023-11-20

## 2023-11-20 RX ORDER — AMOXICILLIN AND CLAVULANATE POTASSIUM 875; 125 MG/1; MG/1
1 TABLET, FILM COATED ORAL 2 TIMES DAILY
Qty: 20 TABLET | Refills: 0 | Status: SHIPPED | OUTPATIENT
Start: 2023-11-20 | End: 2023-11-30

## 2023-11-20 RX ORDER — OXYCODONE HYDROCHLORIDE 5 MG/1
5 TABLET ORAL EVERY 6 HOURS PRN
Qty: 10 TABLET | Refills: 0 | Status: SHIPPED | OUTPATIENT
Start: 2023-11-20 | End: 2023-11-23

## 2023-11-20 RX ORDER — AMOXICILLIN AND CLAVULANATE POTASSIUM 875; 125 MG/1; MG/1
1 TABLET, FILM COATED ORAL 2 TIMES DAILY
Qty: 20 TABLET | Refills: 0 | Status: SHIPPED | OUTPATIENT
Start: 2023-11-20 | End: 2023-11-20 | Stop reason: SDUPTHER

## 2023-11-20 RX ORDER — CLOPIDOGREL BISULFATE 75 MG/1
75 TABLET ORAL DAILY
Qty: 19 TABLET | Refills: 0 | Status: SHIPPED | OUTPATIENT
Start: 2023-11-21 | End: 2023-11-20 | Stop reason: SDUPTHER

## 2023-11-20 RX ORDER — ASPIRIN 81 MG/1
81 TABLET ORAL DAILY
Qty: 30 TABLET | Refills: 0 | Status: SHIPPED | OUTPATIENT
Start: 2023-11-20 | End: 2023-12-20

## 2023-11-20 RX ORDER — CLOPIDOGREL BISULFATE 75 MG/1
75 TABLET ORAL DAILY
Qty: 19 TABLET | Refills: 0 | Status: SHIPPED | OUTPATIENT
Start: 2023-11-21

## 2023-11-20 RX ORDER — OXYCODONE HYDROCHLORIDE 5 MG/1
5 TABLET ORAL EVERY 6 HOURS PRN
Qty: 10 TABLET | Refills: 0 | Status: SHIPPED | OUTPATIENT
Start: 2023-11-20 | End: 2023-11-20 | Stop reason: SDUPTHER

## 2023-11-20 RX ORDER — ATORVASTATIN CALCIUM 40 MG/1
40 TABLET, FILM COATED ORAL NIGHTLY
Qty: 30 TABLET | Refills: 0 | Status: SHIPPED | OUTPATIENT
Start: 2023-11-20

## 2023-11-20 RX ORDER — ASPIRIN 81 MG/1
81 TABLET ORAL DAILY
Qty: 30 TABLET | Refills: 0 | Status: SHIPPED | OUTPATIENT
Start: 2023-11-20 | End: 2023-11-20 | Stop reason: SDUPTHER

## 2023-11-20 RX ORDER — DEXTROSE MONOHYDRATE 100 MG/ML
INJECTION, SOLUTION INTRAVENOUS CONTINUOUS PRN
Status: DISCONTINUED | OUTPATIENT
Start: 2023-11-20 | End: 2023-11-20 | Stop reason: HOSPADM

## 2023-11-20 RX ORDER — ATORVASTATIN CALCIUM 40 MG/1
40 TABLET, FILM COATED ORAL NIGHTLY
Qty: 30 TABLET | Refills: 0 | Status: SHIPPED | OUTPATIENT
Start: 2023-11-20 | End: 2023-11-20 | Stop reason: SDUPTHER

## 2023-11-20 RX ADMIN — INSULIN LISPRO 4 UNITS: 100 INJECTION, SOLUTION INTRAVENOUS; SUBCUTANEOUS at 08:41

## 2023-11-20 RX ADMIN — OXYCODONE HYDROCHLORIDE AND ACETAMINOPHEN 1 TABLET: 5; 325 TABLET ORAL at 08:43

## 2023-11-20 RX ADMIN — INSULIN LISPRO 16 UNITS: 100 INJECTION, SOLUTION INTRAVENOUS; SUBCUTANEOUS at 12:11

## 2023-11-20 RX ADMIN — ASPIRIN 325 MG: 325 TABLET ORAL at 08:42

## 2023-11-20 RX ADMIN — CLOPIDOGREL BISULFATE 75 MG: 75 TABLET ORAL at 08:42

## 2023-11-20 RX ADMIN — HEPARIN SODIUM 5000 UNITS: 5000 INJECTION INTRAVENOUS; SUBCUTANEOUS at 13:49

## 2023-11-20 RX ADMIN — INSULIN LISPRO 13 UNITS: 100 INJECTION, SOLUTION INTRAVENOUS; SUBCUTANEOUS at 08:40

## 2023-11-20 RX ADMIN — INSULIN LISPRO 2 UNITS: 100 INJECTION, SOLUTION INTRAVENOUS; SUBCUTANEOUS at 12:10

## 2023-11-20 RX ADMIN — SODIUM CHLORIDE, PRESERVATIVE FREE 10 ML: 5 INJECTION INTRAVENOUS at 08:44

## 2023-11-20 RX ADMIN — Medication 1 AMPULE: at 08:48

## 2023-11-20 ASSESSMENT — PAIN SCALES - GENERAL
PAINLEVEL_OUTOF10: 0
PAINLEVEL_OUTOF10: 6
PAINLEVEL_OUTOF10: 2
PAINLEVEL_OUTOF10: 0

## 2023-11-20 ASSESSMENT — PAIN DESCRIPTION - LOCATION: LOCATION: RECTUM

## 2023-11-20 ASSESSMENT — PAIN DESCRIPTION - DESCRIPTORS: DESCRIPTORS: BURNING

## 2023-11-20 ASSESSMENT — PAIN DESCRIPTION - ORIENTATION: ORIENTATION: LOWER

## 2023-11-20 NOTE — DIABETES MGMT
[facility]  DIABETES MANAGEMENT CONSULT    Consulted by Provider for advanced nursing evaluation and care for inpatient blood glucose management. Evaluation and Action Plan   This 46year old male patient with Type 1 diabetes did not achieve BG control prior to admission. The patient reports being dx with diabetes approx 5 years ago. He uses an omnipod insulin pump. The patient Omnipod was about to run out and instead of resuming the insulin pump today, he will wait and we will use subQ insulin injections instead. I will start a basal dose tonight that is comparable to his total daily dose on his insulin pump. The patient is being taken down for surgery  ( renal mass). I will see this patient again in the morning. The patient reports doing well this morning. He is eating and tolerating. He received a basal insulin injection last evening, however BG's have been elevated today. I suspect pre-op steroid dose is the primary culprit. I have ordered schedule mealtime insulin since the patient is eating again. I will keep the basal dosing the same for now I suspect the effects of the steroid should dissipate soon. BG's have been elevated on the current dosing. I have increased the basal insulin dosing for tonight and also made adjustments to the scheduled meal time insulin. The patient had a CVA yesterday afternoon and I suspect this may be the culprit in BG elevation as well. BG's remain elevated this morning. The insulin dosing has been increased. The patient likely needs an increase on his insulin pump. The patient will work with his endocrinologist and reports that his next office visit is 12/6/2023. The patient reports that he does not always bolus insulin for meals , but he will work to be more consistent. The patient's Bg's have been elevated on basal/bolus injections. He is expected to discharge today and his Omnipod insulin pump has been resumed.  I suspect BG's well be better controled on the insulin pump. The patient was able to apply the pod and resume settings with no issues. He is alert and oriented and able to operate his own insulin pump. He will follow up with his endocrinologist for future diabetes management. Diabetes Discharge Plan   Medication  Resume Omniod insulin pump at PTA settings   Monitor BG's   Follow up with endocrinologist for future diabetes management   Referral  []        Outpatient diabetes education   Additional orders            Initial Presentation   Tyrone Kilpatrick is a 46 y.o. male admitted  after experiencing illness, generalized weakness and cold sweats x 7-10 days. The patient also reports weight loss due to poor appetite. Michaelle Fuentes LAB: Glucose 385. A1c  10%  CXR:  CT:INDICATION: Rectal pain, groin pain, Aida gangrene. COMPARISON: None. TECHNIQUE: CT of the abdomen and pelvis was performed after the administration  of 100 cc IV Isovue-370. CT dose reduction was achieved through use of a  standardized protocol tailored for this examination and automatic exposure  control for dose modulation. FINDINGS:  Inferior chest: The lung bases are clear. The heart is mildly enlarged. Left  anterior descending coronary calcifications are severe and left circumflex  coronary calcifications are mild. Liver: A tiny hypodense hepatic lesion in segment 5 (379-33) is probably a cyst.  A 12 mm hypodense hepatic mass in the subcapsular aspect of segment 7 (348-52)  has a peripheral nodule of enhancement (3-23), suggesting a benign hemangioma. No clearly concerning hepatic mass. Abdomen: There are small stones in the gallbladder and cystic duct. The  gallbladder is otherwise normal. The distal esophagus, stomach, duodenum,  pancreas, spleen, adrenals, and kidneys are normal.     Anal mass: A heterogeneously hypodense, lobulated anal mass is partially imaged  at the inferior field of view. It measures at least 58 x 37 x 23 mm.  It extends  from at least 11:00 to 5:00, around the left renal sphincter. Anal carcinoma is  favored over abscess. This should be readily evident on physical examination. No  pelvic lymphadenopathy. Pelvis: The small bowel, ileocecal junction, appendix, colon, and bladder are  normal. No free air or fluid, and no abdominopelvic lymphadenopathy. IMPRESSION:  1. Partially imaged anal mass. Carcinoma favored over abscess. This should be  relatively evident on physical examination. 2. Segment 7 hepatic mass is probably a hemangioma. Liver protocol CT or MRI is  recommended for confirmation. This can be performed on a nonemergent, outpatient  basis. 3. Gallstones and cystic duct stones. Otherwise normal gallbladder. HX:   Past Medical History:   Diagnosis Date    Diabetes ([address])     IDDM        INITIAL DX: Hyperglycemia [R73.9]  Rectal mass [K62.89]     Current Treatment     TX: ABX. Insulin     Hospital Course   Clinical progress has been complicated by rectal mass. DATE OF SERVICE:  11/14/2023        PREOPERATIVE DIAGNOSIS:  Rectal mass. POSTOPERATIVE DIAGNOSIS:  Perirectal abscess. PROCEDURE PERFORMED:  Rectal exam under anesthesia, incision and drainage of perirectal abscess, biopsy and drain placement. SURGEON:  Barbi Moore MD     ASSISTANT:  None. ANESTHESIA:  General.     COMPLICATIONS:  None. SPECIMENS REMOVED:  Rectal abscess for culture, Gram stain and pathology. Narrative & Impression  EXAM: MRI BRAIN WO CONTRAST     INDICATION: monoplegia , sudden onset     COMPARISON: None. CONTRAST: None. TECHNIQUE:    Multiplanar multisequence acquisition without contrast of the brain. FINDINGS:  Diffusion imaging show a small right side frontal parietal white matter infarct  likely subacute.    diffusion imaging also show some acute ischemic changes in the a left  parafalcine frontal parietal convexity vascular distribution left anterior  cerebral artery compatible with acute

## 2023-11-20 NOTE — PROGRESS NOTES
End of Shift Note     Bedside shift change report given to Kathi Coker, oncoming nurse) Natty Rodriguez RN (offgoing nurse). Report included the following information Nurse Handoff Report, Intake/Output, MAR, Recent Results, and Cardiac Rhythm sinus tach        Shift worked: 3-7P   Shift summary and any significant changes:     [facility] meds, they are at nurse station to be given to patient upon discharge, oncbrisa RN Kathi Coker hs these meds w/ DC papers in her possession to give to patient upon DC    Concerns for physician to address: none   Zone phone for oncoming shift:  [phone]                 Patient Information  French Acuna  46 y.o.  11/14/2023  6:59 AM by Elizabeth Champagne MD. French Acuna was admitted from Home     Problem List          Patient Active Problem List     Diagnosis Date Noted    Rectal mass 11/14/2023    Hyperglycemia 11/14/2023    Type 1 diabetes mellitus with complication, with long term current use of insulin pump ([address]) 11/14/2023    Hemoglobin A1C greater than 9%, indicating poor diabetic control 11/14/2023      Past Medical History           Past Medical History:   Diagnosis Date    Diabetes ([address])       IDDM            Core Measures:  CVA: Yes Yes  CHF:No No  PNA:NoNo     Activity:  Number times ambulated in hallways past shift: 0  Number of times OOB to chair past shift: 1     Cardiac:   Cardiac Monitoring: no     Cardiac Rhythm:      Access:   Current line(s): PIV     Genitourinary:   Urinary status: voiding      Respiratory:   O2 Device: None (Room air)  Chronic home O2 use?: no  Incentive spirometer at bedside: no     GI:  Last BM (including prior to admit): 11/20/23  Current diet:  ADULT DIET; Regular; 3 carb choices (45 gm/meal)  Passing flatus: yes  Tolerating current diet: yes     Pain Management:   Patient states pain is manageable on current regimen: yes     Skin:  Meño Scale Score: 19  Interventions: increase time out of bed    Patient Safety:  Fall Score:  Dominguez Total

## 2023-11-20 NOTE — CARE COORDINATION
Pt is clear from CM standpoint. Transition of Care Plan:     RUR: 9%  Prior Level of Functioning: Independent  Disposition: Home with outpatient rehab   If SNF or IPR: Date FOC offered:   Date FOC received:   Accepting facility:   Date authorization started with reference number:   Date authorization received and expires: Follow up appointments: Going to [place]  DME needed: No DME needed   Transportation at discharge: Family   IM/IMM Medicare/ letter given: n/a - commercial  Is patient a [place] and connected with [place]? N/a              If yes, was [place] transfer form completed and VA notified? N/a  Caregiver Contact:   Discharge Caregiver contacted prior to discharge? Pt to contact  Care Conference needed? Not at this time  Barriers to discharge: None     11/20/23 [address] Discharge   Transition of Care Consult (CM Consult)   (Outpatient rehab)   [address] Discharge   (Outpatient rehab)   [address] Provided? No   Mode of Transport at Discharge Self   Confirm Follow Up Transport Self   Condition of Participation: Discharge Planning   The Patient and/or Patient Representative was provided with a Choice of Provider? Patient   The Patient and/Or Patient Representative agree with the Discharge Plan? Yes   Freedom of Choice list was provided with basic dialogue that supports the patient's individualized plan of care/goals, treatment preferences, and shares the quality data associated with the providers?   Yes     Amaya Jackson

## 2023-11-20 NOTE — PLAN OF CARE
Problem: Occupational Therapy - Adult  Goal: By Discharge: Performs self-care activities at highest level of function for planned discharge setting. See evaluation for individualized goals. Description: FUNCTIONAL STATUS PRIOR TO ADMISSION:  Patient was independent in ADLs and functional mobility. He lives in [facility] but was in [address] [place] [address] for work.    ,  ,  ,  ,  ,  ,  ,  ,  ,  , Active : Yes     HOME SUPPORT: Patient lived alone with no local support. His daughter and mom live in [place] and he would like to return there at discharge if possible. Occupational Therapy Goals:  Initiated 11/16/2023  1. Patient will perform grooming standing at sink with Supervision within 7 day(s). 2.  Patient will perform upper body dressing with Supervision within 7 day(s). 3.  Patient will perform lower body dressing using AE PRN with Minimal Assist within 7 day(s). 4.  Patient will perform toilet transfers with Supervision  within 7 day(s). 5.  Patient will perform all aspects of toileting with Minimal Assist within 7 day(s). 6.  Patient will participate in upper extremity therapeutic exercise/activities with Supervision for 3 minutes within 7 day(s). Outcome: Adequate for Discharge    OCCUPATIONAL THERAPY TREATMENT/DISCHARGE  Patient: Alden Toscano (52 y.o. male)  Date: 11/20/2023  Primary Diagnosis: Hyperglycemia [R73.9]  Rectal mass [K62.89]  Procedure(s) (LRB):  RECTAL EXAM UNDER ANESTHESIA, BIOPSY OF RECTAL MASS AND WASHOUT (N/A) 6 Days Post-Op   Precautions: Fall Risk, Bed Alarm                  Chart, occupational therapy assessment, plan of care, and goals were reviewed. ASSESSMENT  Patient continues with skilled OT services and has progressed towards goals. Patient received semisupine in bed and cleared for therapy by nursing. He completed bed mobility independently this session and demonstrated intact sitting balance while EOB.  Patient completed sit > stand and walked into bathroom to complete dressing and

## 2023-11-20 NOTE — PLAN OF CARE
Problem: Physical Therapy - Adult  Goal: By Discharge: Performs mobility at highest level of function for planned discharge setting. See evaluation for individualized goals. Description: FUNCTIONAL STATUS PRIOR TO ADMISSION: Patient was independent and active without use of DME. Lives in [place] but was traveling to [address] [address] for work when symptoms occurred. HOME SUPPORT PRIOR TO ADMISSION: The patient lived alone with family in the area to provide assistance. (Reports he is currently getting )    Physical Therapy Goals  Initiated 11/16/2023  1. Patient will move from supine to sit and sit to supine in bed with modified independence within 7 day(s). 2.  Patient will perform sit to stand with supervision/set-up within 7 day(s). 3.  Patient will transfer from bed to chair and chair to bed with contact guard assist using the least restrictive device within 7 day(s). 4.  Patient will ambulate with contact guard assist for 150 feet with the least restrictive device within 7 day(s). Outcome: Progressing     PHYSICAL THERAPY TREATMENT    Patient: Liliane Tony (52 y.o. male)  Date: 11/20/2023  Diagnosis: Hyperglycemia [R73.9]  Rectal mass [K62.89] Rectal mass  Procedure(s) (LRB):  RECTAL EXAM UNDER ANESTHESIA, BIOPSY OF RECTAL MASS AND WASHOUT (N/A) 6 Days Post-Op  Precautions: Fall Risk, Bed Alarm                    ASSESSMENT:  Patient tolerated therapy session well, no adverse events. Today pt ambulated without UE support, close S throughout. Pt demos decreased ankle DF while seated EOB, however, demos adequate foot clearance during ambulation trial. Pt performed stair negotiation with B handrail support, reviewed both reciprocal and step-to stair negotiation, good carryover. Pt to follow up with home health PT in [place], verbalized understanding of HEP and mobility to promote return to PLOF. Patient continues to benefit from skilled PT services and is progressing towards goals.

## 2023-11-20 NOTE — PROGRESS NOTES
Spiritual Care Assessment/Progress Note  Jose    Name: Laura Ruiz MRN: [id number]    Age: 46 y.o. Sex: male   Language: English     Date: 11/20/2023            Total Time Calculated: 18 min              Spiritual Assessment begun in [facility] NEUROSCIENCE TELEMETRY  Service Provided For[de-identified] Patient  Referral/Consult From[de-identified] Rounding  Encounter Overview/Reason : Initial Encounter    Spiritual beliefs:      [] Involved in a alexis tradition/spiritual practice:      [] Supported by a alexis community:      [] Claims no spiritual orientation:      [] Seeking spiritual identity:           [] Adheres to an individual form of spirituality:      [x] Not able to assess:    did not indicate              Identified resources for coping and support system:   Support System: Parent, Children, Family members       [] Prayer                  [] Devotional reading               [] Music                  [] Guided Imagery     [] Pet visits                                        [] Other: (COMMENT)     Specific area/focus of visit   Encounter:    Crisis:    Spiritual/Emotional needs:    Ritual, Rites and Sacraments:    Grief, Loss, and Adjustments:    Ethics/Mediation:    Behavioral Health:    Palliative Care: Advance Care Planning:      Plan/Referrals: Other (Comment) (patient may discharge today)    Narrative:   Reviewed chart prior to visit on Neuro unit for spiritual assessment. No family/friends present. Mr Arora was seated inclined in bed appearing to be in good spirits and was welcoming of visit. Provided supportive listening presence as patient spoke about events that led to hospitalization. He travels for work and was in [place] when onset of symptoms necessitated a visit to the ER. His home is in [place]; his mother and other family members live in [place]. His mother and aunts are coming later today. He expressed no spiritual needs or concerns. Offered words of encouragement.     Elex Query

## 2023-11-20 NOTE — PROGRESS NOTES
End of Shift Note    Bedside shift change report given to RHETT Simon (oncoming nurse) by Danny Olivas RN (offgoing nurse). Report included the following information Nurse Handoff Report, MAR, Recent Results, Med Rec Status, Cardiac Rhythm NSR, Quality Measures, and Neuro Assessment      Shift worked:  7P-7A   Shift summary and any significant changes:     No significant change from previous. Refused AM blood draw for lab work. Concerns for physician to address:  Wants to be discharged today post wound closure procedure. Zone phone for oncoming shift:   [phone]     Patient Information  Tracey  46 y.o.  11/14/2023  6:59 AM by Cory Baldwin MD. Tracey was admitted from Home    Problem List  Patient Active Problem List    Diagnosis Date Noted    Rectal mass 11/14/2023    Hyperglycemia 11/14/2023    Type 1 diabetes mellitus with complication, with long term current use of insulin pump (HCC) 11/14/2023    Hemoglobin A1C greater than 9%, indicating poor diabetic control 11/14/2023     Past Medical History:   Diagnosis Date    Diabetes ([address])     IDDM       Core Measures:  CVA: Yes Yes  CHF:No No  PNA:NoNo    Activity:  Activity: In bed  Number times ambulated in hallways past shift: 0  Number of times OOB to chair past shift: 0    Cardiac:   Cardiac Monitoring: Yes      Cardiac Rhythm: Sinus rhythm    Access:   Current line(s): PIV  Central Line? No Placement date  Reason Medically Necessary   PICC LINE? Placement date Reason Medically Necessary     Genitourinary:   Urinary status:   Urinary Catheter? Placement Date  Reason Medically Necessary     Respiratory:   O2 Device: None (Room air)  Chronic home O2 use?:   Incentive spirometer at bedside:        GI:  Last BM (including prior to admit): 11/17/23  Current diet:  ADULT DIET; Regular; 3 carb choices (45 gm/meal)  Passing flatus:    Tolerating current diet:        Pain Management:   Patient states pain is manageable on current regimen:

## 2023-11-20 NOTE — PROGRESS NOTES
End of Shift Note     Bedside shift change report given to SYLVIE(oncoming nurse) byAurora DELANEY (offgoing nurse). Report included the following information Nurse Handoff Report, Intake/Output, MAR, Recent Results, and Cardiac Rhythm sinus tach        Shift worked:  7a-3p   Shift summary and any significant changes:     Surgery came in and removed drain. Discharged to be picked up by family at [address] [place] [address].      Concerns for physician to address: none   Zone phone for oncoming shift:  [phone]                 Patient Information  Joe Conteh  46 y.o.  11/14/2023  6:59 AM by Monisha Guevara MD. Joe Conteh was admitted from Home     Problem List       Patient Active Problem List     Diagnosis Date Noted    Rectal mass 11/14/2023    Hyperglycemia 11/14/2023    Type 1 diabetes mellitus with complication, with long term current use of insulin pump ([address]) 11/14/2023    Hemoglobin A1C greater than 9%, indicating poor diabetic control 11/14/2023      Past Medical History        Past Medical History:   Diagnosis Date    Diabetes ([address])       IDDM            Core Measures:  CVA: Yes Yes  CHF:No No  PNA:NoNo     Activity:  Number times ambulated in hallways past shift: 0  Number of times OOB to chair past shift: 1     Cardiac:   Cardiac Monitoring: no     Cardiac Rhythm:      Access:   Current line(s): PIV     Genitourinary:   Urinary status: voiding      Respiratory:   O2 Device: None (Room air)  Chronic home O2 use?: no  Incentive spirometer at bedside: no     GI:  Last BM (including prior to admit): 11/20/23  Current diet:  ADULT DIET; Regular; 3 carb choices (45 gm/meal)  Passing flatus: yes  Tolerating current diet: yes     Pain Management:   Patient states pain is manageable on current regimen: yes     Skin:  Meño Scale Score: 19  Interventions: increase time out of bed    Patient Safety:  Fall Score:  Dominguez Total Score: 35  Interventions: bed/chair alarm, assistive devices (walker, cane, etc.), gripper socks, pt to call before

## 2023-11-21 NOTE — PROGRESS NOTES
Care of patient received at 1920 hours, bedside shift report done. Talked about discharge summary, discharge medications and prescriptions. Vital sign done, no IV observed on patient. Independent with ambulation and self care. Patient self packed his belongs. AT 1950 hours discharge summary papers, two different medications; oxycodone and Augmentin was given to patient still sealed in the package brought in by pharmacy and explained to patient the dose and frequency to take medication. F/U appointments explained to patient. Family was at the entrance to pick patient. Patient had no further questions nor concerns when asked by this RN. Patient escorted to the entrance of the building by [employer] where he was picked up by family at [place] hours. Amie Goodpasture, RN.

## 2023-11-22 LAB
BACTERIA SPEC CULT: NORMAL
SERVICE CMNT-IMP: NORMAL

## 2023-11-29 NOTE — PROGRESS NOTES
Physician Progress Note      PATIENT:               Edinson Matias  CSN #:                  [id number]  :                       1970  ADMIT DATE:       2023 6:59 AM  [facility] DATE:        2023 8:30 PM  RESPONDING  PROVIDER #:        Alec Aviles MD          QUERY TEXT:    Pt admitted with perirectal abscess. Pt noted to have wbc 15, hr ,   positive bc. If possible, please document in the progress notes and discharge   summary if you are evaluating and /or treating any of the following: The medical record reflects the following:  Risk Factors: DM1, perirectal abscess, cva  Clinical Indicators: Blood culture on 2023-Klebsiella, Wound culture   with gram-negative rods. wbc 15, hr   Treatment: zosyn, I&D  Thanks, Sela Crigler RN/CARLOS [phone]  Options provided:  -- Sepsis, present on admission  -- Sepsis was ruled out  -- Other - I will add my own diagnosis  -- Disagree - Not applicable / Not valid  -- Disagree - Clinically unable to determine / Unknown  -- Refer to Clinical Documentation Reviewer    PROVIDER RESPONSE TEXT:    After further study, sepsis was ruled out for this patient. Query created by: Sela Crigler on 2023 2:08 PM      QUERY TEXT:    Pt admitted with Perirectal abscess and has malnutrition documented in 11/15   RD pn. Please further specify type of malnutrition with documentation in the   medical record. The medical record reflects the following:  Risk Factors: perirectal abscess  Clinical Indicators: 11/15 RD-Malnutrition Status:   Moderate malnutrition   (11/15/23 1428)  Context:  Acute Illness  Findings of the 6 clinical characteristics of malnutrition:  Energy Intake:  75% or less of estimated energy requirements for 7 or more   days  Weight Loss:  Greater than 2% over 1 week  Body Fat Loss:  No significant body fat loss    Treatment: RD cx Thanks, Sela Crigler RN/CDI [phone]    ASPEN Criteria: https://aspenjournals. onlinelibrary. de. com/doi/full/10.1177/123971834440872  5  Options provided:  -- Moderate Malnutrition  -- Moderate Protein calorie malnutrition  -- Other - I will add my own diagnosis  -- Disagree - Not applicable / Not valid  -- Disagree - Clinically unable to determine / Unknown  -- Refer to Clinical Documentation Reviewer    PROVIDER RESPONSE TEXT:    This patient has moderate protein calorie malnutrition. Query created by: Marin Erickson on 11/17/2023 12:33 PM      Electronically signed by:   Genia Duane MD 11/29/2023 2:10 PM

## (undated) DEVICE — JELLY,LUBE,STERILE,FLIP TOP,TUBE,4-OZ: Brand: MEDLINE

## (undated) DEVICE — DRAIN SURG L18IN DIA025IN 100% SIL RADPQ FOR CLS WND DRNAGE

## (undated) DEVICE — MAJOR LAPAROTOMY-MRMC: Brand: MEDLINE INDUSTRIES, INC.

## (undated) DEVICE — DRAPE,LAPAROTOMY,PED,STERILE: Brand: MEDLINE

## (undated) DEVICE — HYPODERMIC SAFETY NEEDLE: Brand: MONOJECT

## (undated) DEVICE — SOLUTION IRRIG 1000ML 0.9% SOD CHL USP POUR PLAS BTL

## (undated) DEVICE — SYRINGE MED 10ML LUERLOCK TIP W/O SFTY DISP

## (undated) DEVICE — GLOVE ORANGE PI 7   MSG9070

## (undated) DEVICE — SPONGE GZ W4XL4IN COT 12 PLY TYP VII WVN C FLD DSGN STERILE

## (undated) DEVICE — PAD,ABDOMINAL,5"X9",ST,LF,25/BX: Brand: MEDLINE INDUSTRIES, INC.

## (undated) DEVICE — PENCIL ES CRD L10FT HND SWCHING ROCK SWCH W/ EDGE COAT BLDE

## (undated) DEVICE — ELECTRODE PT RET AD L9FT HI MOIST COND ADH HYDRGEL CORDED

## (undated) DEVICE — SUTURE VCRL SZ 3-0 L18IN ABSRB UD L26MM SH 1/2 CIR J864D

## (undated) DEVICE — GLOVE SURG SZ 75 L12IN FNGR THK79MIL GRN LTX FREE

## (undated) DEVICE — KIT,1200CC CANISTER,3/16"X6' TUBING: Brand: MEDLINE INDUSTRIES, INC.

## (undated) DEVICE — SOLUTION SCRB 2OZ 10% POVIDONE IOD ANTIMIC BTL